# Patient Record
Sex: FEMALE | Race: WHITE | Employment: OTHER | URBAN - METROPOLITAN AREA
[De-identification: names, ages, dates, MRNs, and addresses within clinical notes are randomized per-mention and may not be internally consistent; named-entity substitution may affect disease eponyms.]

---

## 2017-04-19 ENCOUNTER — OFFICE VISIT (OUTPATIENT)
Dept: OBGYN | Facility: CLINIC | Age: 82
End: 2017-04-19
Payer: MEDICARE

## 2017-04-19 VITALS
DIASTOLIC BLOOD PRESSURE: 62 MMHG | SYSTOLIC BLOOD PRESSURE: 120 MMHG | WEIGHT: 118 LBS | HEIGHT: 62 IN | BODY MASS INDEX: 21.71 KG/M2

## 2017-04-19 DIAGNOSIS — Z01.419 ENCOUNTER FOR GYNECOLOGICAL EXAMINATION WITHOUT ABNORMAL FINDING: Primary | ICD-10-CM

## 2017-04-19 DIAGNOSIS — Z11.51 SCREENING FOR HUMAN PAPILLOMAVIRUS: ICD-10-CM

## 2017-04-19 PROCEDURE — G0101 CA SCREEN;PELVIC/BREAST EXAM: HCPCS | Performed by: OBSTETRICS & GYNECOLOGY

## 2017-04-19 PROCEDURE — G0145 SCR C/V CYTO,THINLAYER,RESCR: HCPCS | Performed by: OBSTETRICS & GYNECOLOGY

## 2017-04-19 ASSESSMENT — ANXIETY QUESTIONNAIRES
2. NOT BEING ABLE TO STOP OR CONTROL WORRYING: NOT AT ALL
6. BECOMING EASILY ANNOYED OR IRRITABLE: NOT AT ALL
7. FEELING AFRAID AS IF SOMETHING AWFUL MIGHT HAPPEN: NOT AT ALL
3. WORRYING TOO MUCH ABOUT DIFFERENT THINGS: NOT AT ALL
GAD7 TOTAL SCORE: 0
5. BEING SO RESTLESS THAT IT IS HARD TO SIT STILL: NOT AT ALL
1. FEELING NERVOUS, ANXIOUS, OR ON EDGE: NOT AT ALL
IF YOU CHECKED OFF ANY PROBLEMS ON THIS QUESTIONNAIRE, HOW DIFFICULT HAVE THESE PROBLEMS MADE IT FOR YOU TO DO YOUR WORK, TAKE CARE OF THINGS AT HOME, OR GET ALONG WITH OTHER PEOPLE: NOT DIFFICULT AT ALL

## 2017-04-19 ASSESSMENT — PATIENT HEALTH QUESTIONNAIRE - PHQ9: 5. POOR APPETITE OR OVEREATING: NOT AT ALL

## 2017-04-19 NOTE — PROGRESS NOTES
Tanna is a 83 year old  female who presents for Medicare Limited exam.     Do you have a Health Care Directive?: Yes: Patient states has Advance Directive and will bring in a copy to clinic.    Fall risk:   Fall Risk Assessment completed per order.    HPI : The patient is seen at this time for annual exam. She has a recent traumatic fracture of her left wrist and concussion from a traumatic fall.      GYNECOLOGIC HISTORY:  No LMP recorded. Patient is postmenopausal..   reports that she quit smoking about 57 years ago. Her smoking use included Cigarettes. She does not have any smokeless tobacco history on file.    STD testing offered?  Declined  Last PHQ-9 score on record=   PHQ-9 SCORE 2017   Total Score 0     Last GAD7 score on record=   DEQUAN-7 SCORE 2017   Total Score 0       HEALTH MAINTENANCE:  Cholesterol: (  Cholesterol   Date Value Ref Range Status   10/19/2015 229 (A) 115 - 199 mg/dL Final      Last Mammo: 10/2016, Result: normal, Next Mammo: 2017   Pap: (  Lab Results   Component Value Date    PAP NIL 2016    )  DEXA:  1-2 years ago  Colonoscopy:  , Result:  normal, Next Colonoscopy: No further follow up needed per pt.    HISTORY:  Obstetric History       T3      TAB0   SAB0   E0   M0   L3       # Outcome Date GA Lbr Robinson/2nd Weight Sex Delivery Anes PTL Lv   3 Term            2 Term            1 Term                 Past Medical History:   Diagnosis Date     Mild aortic insufficiency      Mild hypercholesterolemia      Squamous cell carcinoma of leg      Past Surgical History:   Procedure Laterality Date     LAPAROSCOPIC SALPINGO-OOPHORECTOMY Bilateral 2015    Procedure: LAPAROSCOPIC SALPINGO-OOPHORECTOMY;  Surgeon: Bob Tabares MD;  Location: [facility]     ORTHOPEDIC SURGERY      left hip replacement     Family History   Problem Relation Age of Onset     Family history unknown: Yes     Social History     Social History     Marital status:       "Spouse name: N/A     Number of children: N/A     Years of education: N/A     Social History Main Topics     Smoking status: Former Smoker     Types: Cigarettes     Quit date: 4/15/1960     Smokeless tobacco: None     Alcohol use 2.5 oz/week     5 Standard drinks or equivalent per week     Drug use: No     Sexual activity: Yes     Partners: Male     Other Topics Concern     None     Social History Narrative     Current Outpatient Prescriptions   Medication Sig     CLIMARA PRO 0.045-0.015 MG/DAY PTWK Place 1 patch onto the skin every 7 days     EPIPEN 2-SARAH 0.3 MG/0.3ML injection 2-pack as needed     COENZYME Q-10 PO Take 200 mg by mouth     NONFORMULARY Calcium 330 mg     tretinoin (RETIN-A) 0.05 % cream      simvastatin (ZOCOR) 20 MG tablet      biotin 2.5 mg/mL Take by mouth daily     VITAMIN D, CHOLECALCIFEROL, PO Take 1,000 Units by mouth daily     No current facility-administered medications for this visit.      Allergies   Allergen Reactions     Bees      Ciprofloxacin Hives     Iodine Other (See Comments)     Throat closing     Penicillins Hives     Remote hx of hives after 10 day course     Epinephrine Palpitations     Sulfa Drugs Rash       Past medical, surgical, social and family history were reviewed and updated in EPIC.    EXAM:  /62  Ht 5' 2.25\" (1.581 m)  Wt 118 lb (53.5 kg)  Breastfeeding? No  BMI 21.41 kg/m2   BMI: Body mass index is 21.41 kg/(m^2).    Constitutional: Appearance: Well nourished, well developed alert, in no acute distress  Breasts: Inspection of Breasts:  No lymphadenopathy present    Palpation of Breasts and Axillae:  No masses present on palpation, no  breast tenderness    Axillary Lymph Nodes:  No lymphadenopathy present  Neurologic/Psychiatric:    Mental Status:  Oriented X3     Pelvic Exam:  External Genitalia:     Normal appearance for age, no discharge present, no tenderness present, no inflammatory lesions present, color normal  Vagina:     Normal vaginal vault " without central or paravaginal defects, ATROPHIC  Bladder:     Nontender to palpation  Urethra:   Urethral Body:  Urethra palpation normal, urethra structural support normal   Urethral Meatus:  No erythema or lesions present  Cervix:     Appearance healthy, no lesions present, nontender to palpation, no bleeding present  Uterus:     Nontender to palpation, no masses present, position anteflexed, mobility: normal  Adnexa:     No adnexal tenderness present, no adnexal masses present  Perineum:     Perineum within normal limits, no evidence of trauma, no rashes or skin lesions present  Inguinal Lymph Nodes:     No lymphadenopathy present      Body mass index is 21.41 kg/(m^2).  Weight management plan noted, stable and monitoring   reports that she quit smoking about 57 years ago. Her smoking use included Cigarettes. She does not have any smokeless tobacco history on file.      ASSESSMENT:  83 year old female with satisfactory annual exam.      COUNSELING:   Reviewed preventive health counseling, as reflected in patient instructions       Regular exercise       Healthy diet/nutrition    PLAN/PATIENT INSTRUCTIONS:    Routine care    Bob Tabares MD

## 2017-04-19 NOTE — LETTER
May 4, 2017      Tanna TIGIST Anselmo  [address] [address]    Dear ,      I am happy to inform you that your cervical cancer screening test (PAP smear) was normal and your Human Papillomavirus (HPV) test was negative.    Per current guidelines, you no longer need to have pap smears completed. A routine pelvic exam should be performed annually.      Please continue to be seen every year for an annual wellness visit and other preventative tests.     Please contact my office at [phone] if you have further questions.    Sincerely,      Bob Tabares MD/BRITNEY RN

## 2017-04-19 NOTE — MR AVS SNAPSHOT
"              After Visit Summary   2017    Tanna Briones    MRN: [id number]           Patient Information     Date Of Birth          3/8/1934        Visit Information        Provider Department      2017 1:30 PM Bob Tabares MD [facility]        Today's Diagnoses     Encounter for gynecological examination without abnormal finding    -  1       Follow-ups after your visit        Who to contact     If you have questions or need follow up information about today's clinic visit or your schedule please contact [facility] directly at [phone].  Normal or non-critical lab and imaging results will be communicated to you by Sysomoshart, letter or phone within 4 business days after the clinic has received the results. If you do not hear from us within 7 days, please contact the clinic through Sysomoshart or phone. If you have a critical or abnormal lab result, we will notify you by phone as soon as possible.  Submit refill requests through ThromboVision or call your pharmacy and they will forward the refill request to us. Please allow 3 business days for your refill to be completed.          Additional Information About Your Visit        MyChart Information     ThromboVision lets you send messages to your doctor, view your test results, renew your prescriptions, schedule appointments and more. To sign up, go to [email or web address] . Click on \"Log in\" on the left side of the screen, which will take you to the Welcome page. Then click on \"Sign up Now\" on the right side of the page.     You will be asked to enter the access code listed below, as well as some personal information. Please follow the directions to create your username and password.     Your access code is: [email or web address]  Expires: 2017  2:02 PM     Your access code will  in 90 days. If you need help or a new code, please call your [facility] or [phone].        Care EveryWhere ID     This is your " "Care EveryWhere ID. This could be used by other organizations to access your [facility] medical records  [id number]        Your Vitals Were     Height Breastfeeding? BMI (Body Mass Index)             5' 2.25\" (1.581 m) No 21.41 kg/m2          Blood Pressure from Last 3 Encounters:   04/19/17 120/62   11/03/16 114/64   09/23/16 120/74    Weight from Last 3 Encounters:   04/19/17 118 lb (53.5 kg)   11/03/16 119 lb (54 kg)   09/23/16 118 lb (53.5 kg)              We Performed the Following     Medicare Limited Visit        Primary Care Provider Office Phone # Fax #    Christiano Clark [phone]       [employer]  [phone]  [facility] [address]        Thank you!     Thank you for choosing [facility] [place]  for your care. Our goal is always to provide you with excellent care. Hearing back from our patients is one way we can continue to improve our services. Please take a few minutes to complete the written survey that you may receive in the mail after your visit with us. Thank you!             Your Updated Medication List - Protect others around you: Learn how to safely use, store and throw away your medicines at www.disposemymeds.org.          This list is accurate as of: 4/19/17  2:19 PM.  Always use your most recent med list.                   Brand Name Dispense Instructions for use    biotin 2.5 mg/mL Susp      Take by mouth daily       CLIMARA PRO 0.045-0.015 MG/DAY Ptwk   Generic drug:  Estradiol-Levonorgestrel     12 patch    Place 1 patch onto the skin every 7 days       COENZYME Q-10 PO      Take 200 mg by mouth       EPIPEN 2-SARAH 0.3 MG/0.3ML injection   Generic drug:  EPINEPHrine      as needed       NONFORMULARY      Calcium 330 mg       simvastatin 20 MG tablet    ZOCOR         tretinoin 0.05 % cream    RETIN-A         VITAMIN D (CHOLECALCIFEROL) PO      Take 1,000 Units by mouth daily         "

## 2017-04-20 ASSESSMENT — ANXIETY QUESTIONNAIRES: GAD7 TOTAL SCORE: 0

## 2017-04-20 ASSESSMENT — PATIENT HEALTH QUESTIONNAIRE - PHQ9: SUM OF ALL RESPONSES TO PHQ QUESTIONS 1-9: 0

## 2017-04-24 LAB
COPATH REPORT: NORMAL
PAP: NORMAL

## 2017-04-25 PROCEDURE — 87624 HPV HI-RISK TYP POOLED RSLT: CPT | Performed by: OBSTETRICS & GYNECOLOGY

## 2017-04-27 LAB
FINAL DIAGNOSIS: NORMAL
HPV HR 12 DNA CVX QL NAA+PROBE: NEGATIVE
HPV16 DNA SPEC QL NAA+PROBE: NEGATIVE
HPV18 DNA SPEC QL NAA+PROBE: NEGATIVE
SPECIMEN DESCRIPTION: NORMAL

## 2017-07-18 ENCOUNTER — OFFICE VISIT (OUTPATIENT)
Dept: OBGYN | Facility: CLINIC | Age: 82
End: 2017-07-18
Payer: MEDICARE

## 2017-07-18 VITALS — BODY MASS INDEX: 21.59 KG/M2 | WEIGHT: 119 LBS | DIASTOLIC BLOOD PRESSURE: 74 MMHG | SYSTOLIC BLOOD PRESSURE: 160 MMHG

## 2017-07-18 DIAGNOSIS — R30.0 DYSURIA: Primary | ICD-10-CM

## 2017-07-18 LAB
ALBUMIN UR-MCNC: NEGATIVE MG/DL
APPEARANCE UR: CLEAR
BILIRUB UR QL STRIP: NEGATIVE
COLOR UR AUTO: YELLOW
GLUCOSE UR STRIP-MCNC: NEGATIVE MG/DL
HGB UR QL STRIP: ABNORMAL
KETONES UR STRIP-MCNC: NEGATIVE MG/DL
LEUKOCYTE ESTERASE UR QL STRIP: NEGATIVE
NITRATE UR QL: NEGATIVE
NON-SQ EPI CELLS #/AREA URNS LPF: ABNORMAL /LPF
PH UR STRIP: 6 PH (ref 5–7)
RBC #/AREA URNS AUTO: ABNORMAL /HPF (ref 0–2)
SP GR UR STRIP: 1.01 (ref 1–1.03)
URN SPEC COLLECT METH UR: ABNORMAL
UROBILINOGEN UR STRIP-ACNC: 0.2 EU/DL (ref 0.2–1)
WBC #/AREA URNS AUTO: ABNORMAL /HPF (ref 0–2)

## 2017-07-18 PROCEDURE — 81001 URINALYSIS AUTO W/SCOPE: CPT | Performed by: OBSTETRICS & GYNECOLOGY

## 2017-07-18 PROCEDURE — 99212 OFFICE O/P EST SF 10 MIN: CPT | Performed by: OBSTETRICS & GYNECOLOGY

## 2017-07-18 RX ORDER — CLINDAMYCIN HCL 300 MG
CAPSULE ORAL
Refills: 0 | COMMUNITY
Start: 2017-06-21

## 2017-07-18 RX ORDER — LEVOTHYROXINE SODIUM 25 UG/1
TABLET ORAL
Refills: 1 | COMMUNITY
Start: 2017-05-03

## 2017-07-18 NOTE — MR AVS SNAPSHOT
"              After Visit Summary   2017    Tanna Briones    MRN: [id number]           Patient Information     Date Of Birth          3/8/1934        Visit Information        Provider Department      2017 8:40 AM Davy Turcios MD [facility]        Today's Diagnoses     Dysuria    -  1       Follow-ups after your visit        Who to contact     If you have questions or need follow up information about today's clinic visit or your schedule please contact [facility] directly at [phone].  Normal or non-critical lab and imaging results will be communicated to you by FlexElhart, letter or phone within 4 business days after the clinic has received the results. If you do not hear from us within 7 days, please contact the clinic through FlexElhart or phone. If you have a critical or abnormal lab result, we will notify you by phone as soon as possible.  Submit refill requests through Graph Alchemist or call your pharmacy and they will forward the refill request to us. Please allow 3 business days for your refill to be completed.          Additional Information About Your Visit        MyChart Information     Graph Alchemist lets you send messages to your doctor, view your test results, renew your prescriptions, schedule appointments and more. To sign up, go to [email or web address] . Click on \"Log in\" on the left side of the screen, which will take you to the Welcome page. Then click on \"Sign up Now\" on the right side of the page.     You will be asked to enter the access code listed below, as well as some personal information. Please follow the directions to create your username and password.     Your access code is: [email or web address]  Expires: 2017  2:02 PM     Your access code will  in 90 days. If you need help or a new code, please call your [facility] or [phone].        Care EveryWhere ID     This is your Care EveryWhere ID. This could be used by other " organizations to access your [facility] medical records  [id number]        Your Vitals Were     Breastfeeding? BMI (Body Mass Index)                No 21.59 kg/m2           Blood Pressure from Last 3 Encounters:   07/18/17 160/74   04/19/17 120/62   11/03/16 114/64    Weight from Last 3 Encounters:   07/18/17 119 lb (54 kg)   04/19/17 118 lb (53.5 kg)   11/03/16 119 lb (54 kg)              We Performed the Following     UA with Microscopic        Primary Care Provider Office Phone # Fax #    Christiano Veronika Clark [phone]       [employer]  [phone]  [facility] [address]        Equal Access to Services     KALA SARABIA : Kosta Benavides, benji powers, miguelina tapia, steve prakash. So [facility] [phone].    ATENCIÓN: Si habla español, tiene a maddox disposición servicios gratuitos de asistencia lingüística. Llame al [phone].    We comply with applicable federal civil rights laws and [place] laws. We do not discriminate on the basis of race, color, national origin, age, disability sex, sexual orientation or gender identity.            Thank you!     Thank you for choosing [facility] [place]  for your care. Our goal is always to provide you with excellent care. Hearing back from our patients is one way we can continue to improve our services. Please take a few minutes to complete the written survey that you may receive in the mail after your visit with us. Thank you!             Your Updated Medication List - Protect others around you: Learn how to safely use, store and throw away your medicines at www.disposemymeds.org.          This list is accurate as of: 7/18/17 10:45 AM.  Always use your most recent med list.                   Brand Name Dispense Instructions for use Diagnosis    biotin 2.5 mg/mL Susp      Take by mouth daily        clindamycin 300 MG capsule    CLEOCIN     TK 1 C PO TID FOR 24 HOURS FOR DENTAL WORK         EPIPEN 2-SARAH 0.3 MG/0.3ML injection 2-pack   Generic drug:  EPINEPHrine      as needed        levothyroxine 25 MCG tablet    SYNTHROID/LEVOTHROID          NONFORMULARY      Calcium 330 mg        PROBIOTIC ACIDOPHILUS PO           simvastatin 20 MG tablet    ZOCOR          tretinoin 0.05 % cream    RETIN-A          VITAMIN D (CHOLECALCIFEROL) PO      Take 1,000 Units by mouth daily

## 2017-07-18 NOTE — PROGRESS NOTES
SUBJECTIVE:                                                   Tanna Briones is a 83 year old female who presents to clinic today for the following health issue(s):  Patient presents with:  Urinary Problem: started yesterday, feeling a pressure. also has urgency      HPI:  Having frequency and bladder pressure. Symptoms started yesterday. No dysuria. No hematuria. Feels symptoms are much better today.  No hx of frequent bladder infections.   No HRT.    No LMP recorded. Patient is postmenopausal..   Patient is sexually active, .  Using menopause for contraception.    reports that she quit smoking about 57 years ago. Her smoking use included Cigarettes. She has never used smokeless tobacco.    STD testing offered?  Declined    Health maintenance updated:  yes    Today's PHQ-2 Score:   PHQ-2 (  [employer]) 3/30/2016   Q1: Little interest or pleasure in doing things 0   Q2: Feeling down, depressed or hopeless 0   PHQ-2 Score 0     Today's PHQ-9 Score:   PHQ-9 SCORE 2017   Total Score 0     Today's DEQUAN-7 Score:   DEQUAN-7 SCORE 2017   Total Score 0       Problem list and histories reviewed & adjusted, as indicated.  Additional history: as documented.    Patient Active Problem List   Diagnosis     Cervical cancer screening     Past Surgical History:   Procedure Laterality Date     LAPAROSCOPIC SALPINGO-OOPHORECTOMY Bilateral 2015    Procedure: LAPAROSCOPIC SALPINGO-OOPHORECTOMY;  Surgeon: Bob Tabares MD;  Location: [facility]     ORTHOPEDIC SURGERY      left hip replacement      Social History   Substance Use Topics     Smoking status: Former Smoker     Types: Cigarettes     Quit date: 4/15/1960     Smokeless tobacco: Never Used     Alcohol use 2.5 oz/week     5 Standard drinks or equivalent per week      *Family history is unknown by patient.            Current Outpatient Prescriptions   Medication Sig     clindamycin (CLEOCIN) 300 MG capsule TK 1 C PO TID FOR 24 HOURS FOR DENTAL WORK      levothyroxine (SYNTHROID/LEVOTHROID) 25 MCG tablet      Lactobacillus (PROBIOTIC ACIDOPHILUS PO)      EPIPEN 2-SARAH 0.3 MG/0.3ML injection 2-pack as needed     NONFORMULARY Calcium 330 mg     tretinoin (RETIN-A) 0.05 % cream      simvastatin (ZOCOR) 20 MG tablet      biotin 2.5 mg/mL Take by mouth daily     VITAMIN D, CHOLECALCIFEROL, PO Take 1,000 Units by mouth daily     No current facility-administered medications for this visit.      Allergies   Allergen Reactions     Bees      Ciprofloxacin Hives     Iodine Other (See Comments)     Throat closing     Penicillins Hives     Remote hx of hives after 10 day course     Epinephrine Palpitations     Sulfa Drugs Rash       ROS:  12 point review of systems negative other than symptoms noted below.  Genitourinary: Urgency    OBJECTIVE:     /74  Wt 119 lb (54 kg)  Breastfeeding? No  BMI 21.59 kg/m2  Body mass index is 21.59 kg/(m^2).    Exam:  Constitutional:  Appearance: Well nourished, well developed alert, in no acute distress  Neurologic/Psychiatric:  Mental Status:  Oriented X3      In-Clinic Test Results:  Results for orders placed or performed in visit on 07/18/17 (from the past 24 hour(s))   UA with Microscopic   Result Value Ref Range    Color Urine Yellow     Appearance Urine Clear     Glucose Urine Negative NEG mg/dL    Bilirubin Urine Negative NEG    Ketones Urine Negative NEG mg/dL    Specific Gravity Urine 1.010 1.003 - 1.035    pH Urine 6.0 5.0 - 7.0 pH    Protein Albumin Urine Negative NEG mg/dL    Urobilinogen Urine 0.2 0.2 - 1.0 EU/dL    Nitrite Urine Negative NEG    Blood Urine Trace (A) NEG    Leukocyte Esterase Urine Negative NEG    Source Midstream Urine     WBC Urine O - 2 0 - 2 /HPF    RBC Urine O - 2 0 - 2 /HPF    Squamous Epithelial /LPF Urine Few FEW /LPF       ASSESSMENT/PLAN:                                                        ICD-10-CM    1. Dysuria R30.0 UA with Microscopic     UA with Microscopic       UA negative and symptoms  not diagnostic. Symptoms also resolved. Will observe for now.     Davy Turcios MD  [facility]

## 2017-08-22 ENCOUNTER — OFFICE VISIT (OUTPATIENT)
Dept: OBGYN | Facility: CLINIC | Age: 82
End: 2017-08-22
Payer: MEDICARE

## 2017-08-22 VITALS — WEIGHT: 120 LBS | DIASTOLIC BLOOD PRESSURE: 78 MMHG | SYSTOLIC BLOOD PRESSURE: 176 MMHG | BODY MASS INDEX: 21.77 KG/M2

## 2017-08-22 DIAGNOSIS — N64.4 BREAST PAIN: Primary | ICD-10-CM

## 2017-08-22 PROCEDURE — 99213 OFFICE O/P EST LOW 20 MIN: CPT | Performed by: OBSTETRICS & GYNECOLOGY

## 2017-08-22 RX ORDER — PREDNISOLONE ACETATE 10 MG/ML
SUSPENSION/ DROPS OPHTHALMIC
COMMUNITY
Start: 2017-08-16 | End: 2018-08-20

## 2017-08-22 RX ORDER — KETOROLAC TROMETHAMINE 5 MG/ML
SOLUTION OPHTHALMIC
COMMUNITY
Start: 2017-08-16 | End: 2018-08-20

## 2017-08-22 RX ORDER — TOBRAMYCIN 3 MG/ML
SOLUTION/ DROPS OPHTHALMIC
COMMUNITY
Start: 2017-08-16 | End: 2018-08-20

## 2017-08-22 RX ORDER — METOPROLOL SUCCINATE 25 MG/1
25 TABLET, EXTENDED RELEASE ORAL
COMMUNITY
Start: 2017-08-21 | End: 2017-12-19

## 2017-08-22 NOTE — PROGRESS NOTES
SUBJECTIVE:                                                   Tanna Briones is a 83 year old female who presents to clinic today for the following health issue(s):  Patient presents with:  Breast Problem        HPI: The patient is seen for 2 problems at this time. The first is a small white area that she pinched on her left nipple and some cheesy material came out. She has a known sebaceous cyst that or clogged duct on that nipple. This was distinctly different than that. She had not noticed any blood. The second issue is her blood pressure is been elevated recently and she was just started on metoprolol. She did not take a dose this morning and her blood pressure was 126/88 in the office today. She is shifting her dose to the PMs.      No LMP recorded. Patient is postmenopausal..   Patient is sexually active, .  Using menopause for contraception.    reports that she quit smoking about 57 years ago. Her smoking use included Cigarettes. She has never used smokeless tobacco.      STD testing offered?  Declined    Health maintenance updated:  yes    Today's PHQ-2 Score:   PHQ-2 (  [employer]) 3/30/2016   Q1: Little interest or pleasure in doing things 0   Q2: Feeling down, depressed or hopeless 0   PHQ-2 Score 0     Today's PHQ-9 Score:   PHQ-9 SCORE 2017   Total Score 0     Today's DEQUAN-7 Score:   DEQUAN-7 SCORE 2017   Total Score 0       Problem list and histories reviewed & adjusted, as indicated.  Additional history: as documented.    Patient Active Problem List   Diagnosis     Cervical cancer screening     Past Surgical History:   Procedure Laterality Date     LAPAROSCOPIC SALPINGO-OOPHORECTOMY Bilateral 2015    Procedure: LAPAROSCOPIC SALPINGO-OOPHORECTOMY;  Surgeon: Bob Tabares MD;  Location: [facility]     ORTHOPEDIC SURGERY      left hip replacement      Social History   Substance Use Topics     Smoking status: Former Smoker     Types: Cigarettes     Quit date: 4/15/1960      Smokeless tobacco: Never Used     Alcohol use 2.5 oz/week     5 Standard drinks or equivalent per week      *Family history is unknown by patient.            Current Outpatient Prescriptions   Medication Sig     ketorolac (ACULAR) 0.5 % ophthalmic solution 1 drop in surgical eye 2x/day for 1 wk, then 1 drop 1x/day for 7 wks. Start 3 days before surgery.     metoprolol (TOPROL-XL) 25 MG 24 hr tablet Take 25 mg by mouth     prednisoLONE acetate (PRED FORTE) 1 % ophthalmic susp 1 drop in surgical eye 4x/day for 1 wk, then 1 drop 2x/day for 7 wks. Start 3 days before surgery. SHAKE WELL     tobramycin (TOBREX) 0.3 % ophthalmic solution 1 drop in surgical eye 4x/day for 1 wk, then 2x/day for 7 wks. Start 3 days before surgery     clindamycin (CLEOCIN) 300 MG capsule TK 1 C PO TID FOR 24 HOURS FOR DENTAL WORK     levothyroxine (SYNTHROID/LEVOTHROID) 25 MCG tablet      Lactobacillus (PROBIOTIC ACIDOPHILUS PO)      EPIPEN 2-SARAH 0.3 MG/0.3ML injection 2-pack as needed     NONFORMULARY Calcium 330 mg     tretinoin (RETIN-A) 0.05 % cream      simvastatin (ZOCOR) 20 MG tablet      biotin 2.5 mg/mL Take by mouth daily     VITAMIN D, CHOLECALCIFEROL, PO Take 1,000 Units by mouth daily     No current facility-administered medications for this visit.      Allergies   Allergen Reactions     Bees      Ciprofloxacin Hives     Iodine Other (See Comments)     Throat closing     Penicillins Hives     Remote hx of hives after 10 day course     Epinephrine Palpitations     Sulfa Drugs Rash     Sulfasalazine Rash       ROS:  12 point review of systems negative other than symptoms noted below.    OBJECTIVE:     /78  Wt 120 lb (54.4 kg)  Breastfeeding? No  BMI 21.77 kg/m2  Body mass index is 21.77 kg/(m^2).    Exam:  Constitutional:  Appearance: Well nourished, well developed alert, in no acute distress  Breasts:  Inspection of Breasts:  No lymphadenopathy present; Palpation of Breasts and Axillae:  No masses present on palpation, no  breast tenderness Axillary Lymph Nodes:  No lymphadenopathy present     In-Clinic Test Results:      ASSESSMENT/PLAN:                                                      The patient had a small sebaceous cyst or clogged duct on the left nipple. We have asked her to not impinge or manipulate this. She will see her internist for blood pressure monitoring and potential change of medications if her diastolics are still high. She has a mild headache and is having her home at this time.            Bob Tabares MD  [facility] [place]

## 2017-08-22 NOTE — MR AVS SNAPSHOT
"              After Visit Summary   2017    Tanna Briones    MRN: [id number]           Patient Information     Date Of Birth          3/8/1934        Visit Information        Provider Department      2017 4:30 PM Bob Tabares MD [facility]        Today's Diagnoses     Breast pain    -  1       Follow-ups after your visit        Who to contact     If you have questions or need follow up information about today's clinic visit or your schedule please contact [facility] directly at [phone].  Normal or non-critical lab and imaging results will be communicated to you by Miaoyushanghart, letter or phone within 4 business days after the clinic has received the results. If you do not hear from us within 7 days, please contact the clinic through Miaoyushanghart or phone. If you have a critical or abnormal lab result, we will notify you by phone as soon as possible.  Submit refill requests through OpenSpirit or call your pharmacy and they will forward the refill request to us. Please allow 3 business days for your refill to be completed.          Additional Information About Your Visit        MyChart Information     OpenSpirit lets you send messages to your doctor, view your test results, renew your prescriptions, schedule appointments and more. To sign up, go to [email or web address] . Click on \"Log in\" on the left side of the screen, which will take you to the Welcome page. Then click on \"Sign up Now\" on the right side of the page.     You will be asked to enter the access code listed below, as well as some personal information. Please follow the directions to create your username and password.     Your access code is: [email or web address]  Expires: 2017  4:48 PM     Your access code will  in 90 days. If you need help or a new code, please call your [place] clinic or [phone].        Care EveryWhere ID     This is your Care EveryWhere ID. This could be used by other " organizations to access your [facility] medical records  [id number]        Your Vitals Were     Breastfeeding? BMI (Body Mass Index)                No 21.77 kg/m2           Blood Pressure from Last 3 Encounters:   08/22/17 176/78   07/18/17 160/74   04/19/17 120/62    Weight from Last 3 Encounters:   08/22/17 54.4 kg (120 lb)   07/18/17 54 kg (119 lb)   04/19/17 53.5 kg (118 lb)              Today, you had the following     No orders found for display       Primary Care Provider Office Phone # Fax #    Christiano Veronika Clark [phone]       [employer] [address]  [facility] [address]        Equal Access to Services     KALA SARABIA : Kosta coelloo Makayla, waaxda luqadaha, qaybta kaalmada adehay, steve prakash. So [facility] [phone].    ATENCIÓN: Si habla español, tiene a maddox disposición servicios gratuitos de asistencia lingüística. [facility] [phone].    We comply with applicable federal civil rights laws and [place] laws. We do not discriminate on the basis of race, color, national origin, age, disability sex, sexual orientation or gender identity.            Thank you!     Thank you for choosing [facility]  for your care. Our goal is always to provide you with excellent care. Hearing back from our patients is one way we can continue to improve our services. Please take a few minutes to complete the written survey that you may receive in the mail after your visit with us. Thank you!             Your Updated Medication List - Protect others around you: Learn how to safely use, store and throw away your medicines at www.disposemymeds.org.          This list is accurate as of: 8/22/17  4:48 PM.  Always use your most recent med list.                   Brand Name Dispense Instructions for use Diagnosis    biotin 2.5 mg/mL Susp      Take by mouth daily        clindamycin 300 MG capsule    CLEOCIN     TK 1 C PO TID FOR 24 HOURS FOR DENTAL  WORK        EPIPEN 2-SARAH 0.3 MG/0.3ML injection 2-pack   Generic drug:  EPINEPHrine      as needed        ketorolac 0.5 % ophthalmic solution    ACULAR     1 drop in surgical eye 2x/day for 1 wk, then 1 drop 1x/day for 7 wks. Start 3 days before surgery.        levothyroxine 25 MCG tablet    SYNTHROID/LEVOTHROID          metoprolol 25 MG 24 hr tablet    TOPROL-XL     Take 25 mg by mouth        NONFORMULARY      Calcium 330 mg        prednisoLONE acetate 1 % ophthalmic susp    PRED FORTE     1 drop in surgical eye 4x/day for 1 wk, then 1 drop 2x/day for 7 wks. Start 3 days before surgery. SHAKE WELL        PROBIOTIC ACIDOPHILUS PO           simvastatin 20 MG tablet    ZOCOR          tobramycin 0.3 % ophthalmic solution    TOBREX     1 drop in surgical eye 4x/day for 1 wk, then 2x/day for 7 wks. Start 3 days before surgery        tretinoin 0.05 % cream    RETIN-A          VITAMIN D (CHOLECALCIFEROL) PO      Take 1,000 Units by mouth daily

## 2017-12-19 ENCOUNTER — TELEPHONE (OUTPATIENT)
Dept: OBGYN | Facility: CLINIC | Age: 82
End: 2017-12-19

## 2017-12-19 ENCOUNTER — OFFICE VISIT (OUTPATIENT)
Dept: OBGYN | Facility: CLINIC | Age: 82
End: 2017-12-19
Payer: MEDICARE

## 2017-12-19 VITALS — SYSTOLIC BLOOD PRESSURE: 142 MMHG | DIASTOLIC BLOOD PRESSURE: 64 MMHG | WEIGHT: 115 LBS | BODY MASS INDEX: 20.87 KG/M2

## 2017-12-19 DIAGNOSIS — L29.2 VULVAR ITCHING: Primary | ICD-10-CM

## 2017-12-19 LAB
SPECIMEN SOURCE: ABNORMAL
WET PREP SPEC: ABNORMAL

## 2017-12-19 PROCEDURE — 99213 OFFICE O/P EST LOW 20 MIN: CPT | Performed by: NURSE PRACTITIONER

## 2017-12-19 PROCEDURE — 87210 SMEAR WET MOUNT SALINE/INK: CPT | Performed by: NURSE PRACTITIONER

## 2017-12-19 RX ORDER — IBUPROFEN 200 MG
400 TABLET ORAL
COMMUNITY
Start: 2017-11-15

## 2017-12-19 RX ORDER — METOPROLOL SUCCINATE 50 MG/1
TABLET, EXTENDED RELEASE ORAL
Refills: 0 | COMMUNITY
Start: 2017-10-03 | End: 2018-02-07 | Stop reason: DRUGHIGH

## 2017-12-19 RX ORDER — NYSTATIN AND TRIAMCINOLONE ACETONIDE 100000; 1 [USP'U]/G; MG/G
CREAM TOPICAL 2 TIMES DAILY
Qty: 15 G | Refills: 1 | Status: SHIPPED | OUTPATIENT
Start: 2017-12-19 | End: 2019-04-24

## 2017-12-19 RX ORDER — ERGOCALCIFEROL 1.25 MG/1
CAPSULE, LIQUID FILLED ORAL
COMMUNITY
End: 2017-12-19

## 2017-12-19 RX ORDER — AZITHROMYCIN 500 MG/1
TABLET, FILM COATED ORAL
COMMUNITY
Start: 2017-09-19 | End: 2018-08-20

## 2017-12-19 RX ORDER — ALPRAZOLAM 0.25 MG
0.25 TABLET ORAL
COMMUNITY
Start: 2017-08-23

## 2017-12-19 NOTE — TELEPHONE ENCOUNTER
Reason for Call:  Other call back and prescription    Detailed comments: CALL ANUP AT [facility]    Phone Number Patient can be reached at: Other phone number:  [phone]    Best Time: TODAY    Can we leave a detailed message on this number? Not Applicable    Call taken on 12/19/2017 at 4:00 PM by Sabrina Matos

## 2017-12-19 NOTE — MR AVS SNAPSHOT
"              After Visit Summary   2017    Tanna Briones    MRN: [id number]           Patient Information     Date Of Birth          3/8/1934        Visit Information        Provider Department      2017 10:30 AM Justina Moreno APRN CNP [facility]        Today's Diagnoses     Vulvar itching    -  1       Follow-ups after your visit        Who to contact     If you have questions or need follow up information about today's clinic visit or your schedule please contact [facility] directly at [phone].  Normal or non-critical lab and imaging results will be communicated to you by Evozhart, letter or phone within 4 business days after the clinic has received the results. If you do not hear from us within 7 days, please contact the clinic through Evozhart or phone. If you have a critical or abnormal lab result, we will notify you by phone as soon as possible.  Submit refill requests through Elysia or call your pharmacy and they will forward the refill request to us. Please allow 3 business days for your refill to be completed.          Additional Information About Your Visit        MyChart Information     Elysia lets you send messages to your doctor, view your test results, renew your prescriptions, schedule appointments and more. To sign up, go to [email or web address] . Click on \"Log in\" on the left side of the screen, which will take you to the Welcome page. Then click on \"Sign up Now\" on the right side of the page.     You will be asked to enter the access code listed below, as well as some personal information. Please follow the directions to create your username and password.     Your access code is: [email or web address]  Expires: 3/19/2018 11:03 AM     Your access code will  in 90 days. If you need help or a new code, please call your [facility] or [phone].        Care EveryWhere ID     This is your Care EveryWhere ID. This could be used " by other organizations to access your [place] medical records  [id number]        Your Vitals Were     Breastfeeding? BMI (Body Mass Index)                No 20.87 kg/m2           Blood Pressure from Last 3 Encounters:   12/19/17 142/64   08/22/17 176/78   07/18/17 160/74    Weight from Last 3 Encounters:   12/19/17 115 lb (52.2 kg)   08/22/17 120 lb (54.4 kg)   07/18/17 119 lb (54 kg)              We Performed the Following     Wet prep          Today's Medication Changes          These changes are accurate as of: 12/19/17 11:03 AM.  If you have any questions, ask your nurse or doctor.               Start taking these medicines.        Dose/Directions    nystatin-triamcinolone cream   Commonly known as:  MYCOLOG II   Used for:  Vulvar itching   Started by:  Justina Moreno APRN CNP        Apply topically 2 times daily External use only.   Quantity:  15 g   Refills:  1            Where to get your medicines      These medications were sent to [email or web address] [address] [place] [address] RD S AT [facility] [place]  [address] [facility] [address], [facility] [address]     Phone:  [phone]     nystatin-triamcinolone cream                Primary Care Provider Office Phone # Fax #    Christiano Veronika Clark [phone]       [facility] [address]  [facility] [address]        Equal Access to Services     EMANI SARABIA AH: Kosta Benavides, benji powers, qaybdaina mccallumalsteve nicholson [facility] prakash. So [place] [phone].    ATENCIÓN: Si habla español, tiene a maddox disposición servicios gratuitos de asistencia lingüística. Llame al [phone].    We comply with applicable federal civil rights laws and [place] laws. We do not discriminate on the basis of race, color, national origin, age, disability, sex, sexual orientation, or gender identity.            Thank you!     Thank you for choosing [facility]  for  your care. Our goal is always to provide you with excellent care. Hearing back from our patients is one way we can continue to improve our services. Please take a few minutes to complete the written survey that you may receive in the mail after your visit with us. Thank you!             Your Updated Medication List - Protect others around you: Learn how to safely use, store and throw away your medicines at www.disposemymeds.org.          This list is accurate as of: 12/19/17 11:03 AM.  Always use your most recent med list.                   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 Start 3 days before surgery.        levothyroxine 25 MCG tablet    SYNTHROID/LEVOTHROID          metoprolol 50 MG 24 hr tablet    TOPROL-XL     Take 75mg daily        nystatin-triamcinolone cream    MYCOLOG II    15 g    Apply topically 2 times daily External use only.    Vulvar itching       prednisoLONE acetate 1 % ophthalmic susp    PRED FORTE     1 drop in surgical eye 4x/day for 1 wk, then 1 drop 2x/day for 7 wks. Start 3 days before surgery. SHAKE WELL        PROBIOTIC ACIDOPHILUS PO           simvastatin 20 MG tablet    ZOCOR          tobramycin 0.3 % ophthalmic solution    TOBREX     1 drop in surgical eye 4x/day for 1 wk, then 2x/day for 7 wks. Start 3 days before surgery        tretinoin 0.05 % cream    RETIN-A          VITAMIN D  (CHOLECALCIFEROL) PO      Take 1,000 Units by mouth daily

## 2017-12-19 NOTE — TELEPHONE ENCOUNTER
Braulio from pharmacy states pt's insurance will not cover combo cream, but will cover them as separate ingredients. Pharmacist wants to know if ok to separate. Routing to Justina Moreno to review and advise.

## 2017-12-19 NOTE — PROGRESS NOTES
SUBJECTIVE:                                                   Tanna Briones is a 83 year old female who presents to clinic today for the following health issue(s):  Patient presents with:  Vaginal Problem      HPI:  Pt here today with c/o vaginal itching. She noticed it a few days ago, she has not used anything OTC. She states is more external. No internal itching. No discharge or odor.    She bathes with hot water and typically bubble bath.    Her  has terrible dementia and is at [facility] right now waiting for placement.    No LMP recorded. Patient is postmenopausal..   Patient is not sexually active, .  Using menopause for contraception.    reports that she quit smoking about 57 years ago. Her smoking use included Cigarettes. She has never used smokeless tobacco.      STD testing offered?  Declined    Health maintenance updated:  yes    Today's PHQ-2 Score:   PHQ-2 (  [employer]) 3/30/2016   Q1: Little interest or pleasure in doing things 0   Q2: Feeling down, depressed or hopeless 0   PHQ-2 Score 0     Today's PHQ-9 Score:   PHQ-9 SCORE 2017   Total Score 0     Today's DEQUAN-7 Score:   DEQUAN-7 SCORE 2017   Total Score 0       Problem list and histories reviewed & adjusted, as indicated.  Additional history: as documented.    Patient Active Problem List   Diagnosis     Cervical cancer screening     Past Surgical History:   Procedure Laterality Date     LAPAROSCOPIC SALPINGO-OOPHORECTOMY Bilateral 2015    Procedure: LAPAROSCOPIC SALPINGO-OOPHORECTOMY;  Surgeon: Bob Tabares MD;  Location: [facility]     ORTHOPEDIC SURGERY      left hip replacement      Social History   Substance Use Topics     Smoking status: Former Smoker     Types: Cigarettes     Quit date: 4/15/1960     Smokeless tobacco: Never Used     Alcohol use 2.5 oz/week     5 Standard drinks or equivalent per week      *Family history is unknown by patient.            Current Outpatient Prescriptions   Medication Sig      ibuprofen (ADVIL/MOTRIN) 200 MG tablet Take 400 mg by mouth     azithromycin (ZITHROMAX) 500 MG tablet One PO one hour prior to procedure     ALPRAZolam (XANAX) 0.25 MG tablet Take 0.25 mg by mouth     metoprolol (TOPROL-XL) 50 MG 24 hr tablet Take 75mg daily     nystatin-triamcinolone (MYCOLOG II) cream Apply topically 2 times daily External use only.     ketorolac (ACULAR) 0.5 % ophthalmic solution 1 drop in surgical eye 2x/day for 1 wk, then 1 drop 1x/day for 7 wks. Start 3 days before surgery.     prednisoLONE acetate (PRED FORTE) 1 % ophthalmic susp 1 drop in surgical eye 4x/day for 1 wk, then 1 drop 2x/day for 7 wks. Start 3 days before surgery. SHAKE WELL     tobramycin (TOBREX) 0.3 % ophthalmic solution 1 drop in surgical eye 4x/day for 1 wk, then 2x/day for 7 wks. Start 3 days before surgery     clindamycin (CLEOCIN) 300 MG capsule TK 1 C PO TID FOR 24 HOURS FOR DENTAL WORK     levothyroxine (SYNTHROID/LEVOTHROID) 25 MCG tablet      Lactobacillus (PROBIOTIC ACIDOPHILUS PO)      EPIPEN 2-SARAH 0.3 MG/0.3ML injection 2-pack as needed     tretinoin (RETIN-A) 0.05 % cream      simvastatin (ZOCOR) 20 MG tablet      biotin 2.5 mg/mL Take by mouth daily     VITAMIN D, CHOLECALCIFEROL, PO Take 1,000 Units by mouth daily     [DISCONTINUED] metoprolol (TOPROL-XL) 25 MG 24 hr tablet Take 25 mg by mouth     No current facility-administered medications for this visit.      Allergies   Allergen Reactions     Bees      Ciprofloxacin Hives     Iodine Other (See Comments)     Throat closing     Penicillins Hives     Remote hx of hives after 10 day course     Epinephrine Palpitations     Sulfa Drugs Rash     Sulfasalazine Rash       ROS:  12 point review of systems negative other than symptoms noted below.  Genitourinary: Vaginal Itching  Psychiatric: Anxiety    OBJECTIVE:     /64  Wt 115 lb (52.2 kg)  Breastfeeding? No  BMI 20.87 kg/m2  Body mass index is 20.87 kg/(m^2).    Exam:  Constitutional:  Appearance: Well  nourished, well developed alert, in no acute distress  Neurologic/Psychiatric:  Mental Status:  Oriented X3   Pelvic Exam:  External Genitalia:     Normal appearance for age, no discharge present, no tenderness present, no inflammatory lesions present, color normal  Vagina:     Normal vaginal vault without central or paravaginal defects, ATROPHIC  Bladder:     Nontender to palpation  Urethra:   Urethral Body:  Urethra palpation normal, urethra structural support normal   Urethral Meatus:  No erythema or lesions present  Cervix:     Appearance healthy, no lesions present, nontender to palpation, no bleeding present  Uterus:     Nontender to palpation, no masses present, position anteflexed, mobility: normal  Adnexa:     No adnexal tenderness present, no adnexal masses present  Perineum:     Perineum within normal limits, no evidence of trauma, no rashes or skin lesions present  Inguinal Lymph Nodes:     No lymphadenopathy present       In-Clinic Test Results:  Results for orders placed or performed in visit on 12/19/17 (from the past 24 hour(s))   Wet prep   Result Value Ref Range    Specimen Description Vagina     Wet Prep No Trichomonas seen     Wet Prep Clue cells seen (A)     Wet Prep No yeast seen        ASSESSMENT/PLAN:                                                        ICD-10-CM    1. Vulvar itching L29.2 Wet prep     nystatin-triamcinolone (MYCOLOG II) cream       There are no Patient Instructions on file for this visit.    Wet prep + BV. Pt reluctant to go on abx. Will continue bath soaks WITH OUT bubble bath. Will rx cream for external vulvar irritation.    DIANA Dewitt [facility]

## 2018-02-07 ENCOUNTER — OFFICE VISIT (OUTPATIENT)
Dept: OBGYN | Facility: CLINIC | Age: 83
End: 2018-02-07
Payer: MEDICARE

## 2018-02-07 VITALS
WEIGHT: 118 LBS | DIASTOLIC BLOOD PRESSURE: 76 MMHG | HEART RATE: 72 BPM | BODY MASS INDEX: 21.71 KG/M2 | HEIGHT: 62 IN | SYSTOLIC BLOOD PRESSURE: 134 MMHG

## 2018-02-07 DIAGNOSIS — N89.8 VAGINAL IRRITATION: Primary | ICD-10-CM

## 2018-02-07 LAB
GRAM STN SPEC: NORMAL
GRAM STN SPEC: NORMAL
SPECIMEN SOURCE: NORMAL

## 2018-02-07 PROCEDURE — 87205 SMEAR GRAM STAIN: CPT | Performed by: OBSTETRICS & GYNECOLOGY

## 2018-02-07 PROCEDURE — 99213 OFFICE O/P EST LOW 20 MIN: CPT | Performed by: OBSTETRICS & GYNECOLOGY

## 2018-02-07 RX ORDER — METOPROLOL SUCCINATE 25 MG/1
75 TABLET, EXTENDED RELEASE ORAL
COMMUNITY
Start: 2018-01-04

## 2018-02-07 NOTE — PROGRESS NOTES
SUBJECTIVE:                                                   Tanna Briones is a 83 year old female who presents to clinic today for the following health issue(s):  Patient presents with:  RECHECK: Wants to be rechecked for BV; saw Justina in December, was dx with BV. Never took antibiotic and not having any more sx. Wants to know if gone      HPI: The patient is seen in follow-up of both vulvar and vaginal irritation.  She has no abnormal discharge at this time.  She is still treating symptomatically with Cystospaz on a fairly regular basis.  She has no specific lesions externally.      No LMP recorded. Patient is postmenopausal..   Patient is not sexually active, .  Using not sexually active for contraception.    reports that she quit smoking about 57 years ago. Her smoking use included Cigarettes. She has never used smokeless tobacco.    STD testing offered?  Declined    Health maintenance updated:  yes    Today's PHQ-2 Score:   PHQ-2 (  [employer]) 3/30/2016   Q1: Little interest or pleasure in doing things 0   Q2: Feeling down, depressed or hopeless 0   PHQ-2 Score 0     Today's PHQ-9 Score:   PHQ-9 SCORE 2017   Total Score 0     Today's DEQUAN-7 Score:   DEQUAN-7 SCORE 2017   Total Score 0       Problem list and histories reviewed & adjusted, as indicated.  Additional history: as documented.    Patient Active Problem List   Diagnosis     Cervical cancer screening     Past Surgical History:   Procedure Laterality Date     LAPAROSCOPIC SALPINGO-OOPHORECTOMY Bilateral 2015    Procedure: LAPAROSCOPIC SALPINGO-OOPHORECTOMY;  Surgeon: Bob Tabares MD;  [facility]     ORTHOPEDIC SURGERY      left hip replacement      Social History   Substance Use Topics     Smoking status: Former Smoker     Types: Cigarettes     Quit date: 4/15/1960     Smokeless tobacco: Never Used     Alcohol use 2.5 oz/week     5 Standard drinks or equivalent per week      *Family history is unknown by patient.     "        Current Outpatient Prescriptions   Medication Sig     metoprolol succinate (TOPROL-XL) 25 MG 24 hr tablet Take 25 mg by mouth     ibuprofen (ADVIL/MOTRIN) 200 MG tablet Take 400 mg by mouth     azithromycin (ZITHROMAX) 500 MG tablet One PO one hour prior to procedure     ALPRAZolam (XANAX) 0.25 MG tablet Take 0.25 mg by mouth     nystatin-triamcinolone (MYCOLOG II) cream Apply topically 2 times daily External use only.     ketorolac (ACULAR) 0.5 % ophthalmic solution 1 drop in surgical eye 2x/day for 1 wk, then 1 drop 1x/day for 7 wks. Start 3 days before surgery.     prednisoLONE acetate (PRED FORTE) 1 % ophthalmic susp 1 drop in surgical eye 4x/day for 1 wk, then 1 drop 2x/day for 7 wks. Start 3 days before surgery. SHAKE WELL     tobramycin (TOBREX) 0.3 % ophthalmic solution 1 drop in surgical eye 4x/day for 1 wk, then 2x/day for 7 wks. Start 3 days before surgery     clindamycin (CLEOCIN) 300 MG capsule TK 1 C PO TID FOR 24 HOURS FOR DENTAL WORK     levothyroxine (SYNTHROID/LEVOTHROID) 25 MCG tablet      Lactobacillus (PROBIOTIC ACIDOPHILUS PO)      tretinoin (RETIN-A) 0.05 % cream      simvastatin (ZOCOR) 20 MG tablet      biotin 2.5 mg/mL Take by mouth daily     VITAMIN D, CHOLECALCIFEROL, PO Take 1,000 Units by mouth daily     [DISCONTINUED] metoprolol (TOPROL-XL) 50 MG 24 hr tablet Take 75mg daily     EPIPEN 2-SARAH 0.3 MG/0.3ML injection 2-pack as needed     No current facility-administered medications for this visit.      Allergies   Allergen Reactions     Bees      Ciprofloxacin Hives     Iodine Other (See Comments)     Throat closing     Penicillins Hives     Remote hx of hives after 10 day course     Epinephrine Palpitations     Sulfa Drugs Rash     Sulfasalazine Rash       ROS:  12 point review of systems negative other than symptoms noted below.    OBJECTIVE:     /76  Pulse 72  Ht 5' 2.25\" (1.581 m)  Wt 118 lb (53.5 kg)  BMI 21.41 kg/m2  Body mass index is 21.41 " kg/(m^2).    Exam:  Constitutional:  Appearance: Well nourished, well developed alert, in no acute distress  Gastrointestinal:  Abdominal Examination:  Abdomen nontender to palpation, tone normal without rigidity or guarding, no masses present, umbilicus without lesions; Liver/Spleen:  No hepatomegaly present, liver nontender to palpation; Hernias:  No hernias present  Lymphatic: Lymph Nodes:  No other lymphadenopathy present  Skin:General Inspection:  No rashes present, no lesions present, no areas of discoloration; Genitalia and Groin:  No rashes present, no lesions present, no areas of discoloration, no masses present.  Neurologic/Psychiatric:  Mental Status:  Oriented X3   Pelvic Exam:  External Genitalia:     Normal appearance for age, no discharge present, no tenderness present, no inflammatory lesions present, color normal  Vagina:     Normal vaginal vault without central or paravaginal defects, ATROPHIC  Bladder:     Nontender to palpation  Urethra:   Urethral Body:  Urethra palpation normal, urethra structural support normal   Urethral Meatus:  No erythema or lesions present  Cervix:     Appearance healthy, no lesions present, nontender to palpation, no bleeding present  Uterus:     Nontender to palpation, no masses present, position anteflexed, mobility: normal  Adnexa:     No adnexal tenderness present, no adnexal masses present  Perineum:     Perineum within normal limits, no evidence of trauma, no rashes or skin lesions present  Inguinal Lymph Nodes:     No lymphadenopathy present       In-Clinic Test Results:      ASSESSMENT/PLAN:                                                        83-year-old patient who is stable at both the vaginal and vulvar levels.  Culture is pending at this time.  She will continue with her sitz bath regimen and report any specific lesions to us.      Bob Tabares MD  [facility] [place]

## 2018-02-07 NOTE — MR AVS SNAPSHOT
"              After Visit Summary   2018    Tanna Briones    MRN: [id number]           Patient Information     Date Of Birth          3/8/1934        Visit Information        Provider Department      2018 10:00 AM Bob Tabares MD [facility]        Today's Diagnoses     Bacterial vaginosis    -  1       Follow-ups after your visit        Who to contact     If you have questions or need follow up information about today's clinic visit or your schedule please contact [facility] directly at [phone].  Normal or non-critical lab and imaging results will be communicated to you by CodeBabyhart, letter or phone within 4 business days after the clinic has received the results. If you do not hear from us within 7 days, please contact the clinic through CodeBabyhart or phone. If you have a critical or abnormal lab result, we will notify you by phone as soon as possible.  Submit refill requests through TVPage or call your pharmacy and they will forward the refill request to us. Please allow 3 business days for your refill to be completed.          Additional Information About Your Visit        MyChart Information     TVPage lets you send messages to your doctor, view your test results, renew your prescriptions, schedule appointments and more. To sign up, go to [email or web address] . Click on \"Log in\" on the left side of the screen, which will take you to the Welcome page. Then click on \"Sign up Now\" on the right side of the page.     You will be asked to enter the access code listed below, as well as some personal information. Please follow the directions to create your username and password.     Your access code is: [email or web address]  Expires: 3/19/2018 11:03 AM     Your access code will  in 90 days. If you need help or a new code, please call your [place] clinic or [phone].        Care EveryWhere ID     This is your Care EveryWhere ID. This could be used by " "other organizations to access your [place] medical records  [id number]        Your Vitals Were     Pulse Height BMI (Body Mass Index)             72 5' 2.25\" (1.581 m) 21.41 kg/m2          Blood Pressure from Last 3 Encounters:   02/07/18 134/76   12/19/17 142/64   08/22/17 176/78    Weight from Last 3 Encounters:   02/07/18 118 lb (53.5 kg)   12/19/17 115 lb (52.2 kg)   08/22/17 120 lb (54.4 kg)              We Performed the Following     Genital Culture Aerobic Bacterial     Gram stain          Today's Medication Changes          These changes are accurate as of 2/7/18 10:43 AM.  If you have any questions, ask your nurse or doctor.               These medicines have changed or have updated prescriptions.        Dose/Directions    metoprolol succinate 25 MG 24 hr tablet   Commonly known as:  TOPROL-XL   This may have changed:  Another medication with the same name was removed. Continue taking this medication, and follow the directions you see here.   Changed by:  Bob Tabares MD        Dose:  25 mg   Take 25 mg by mouth   Refills:  0                Primary Care Provider Office Phone # Fax #    Christiano Veronika Clark [phone]       [facility] [address]  [place] [id number]        Equal Access to Services     EMANI SARABIA AH: Kosta coelloo Sorandy, waaxda luqadaha, qaybta kaalmada adeegyada, steve prakash. So [facility] [phone].    ATENCIÓN: Si habla español, tiene a maddox disposición servicios gratuitos de asistencia lingüística. Llame al [phone].    We comply with applicable federal civil rights laws and [place] laws. We do not discriminate on the basis of race, color, national origin, age, disability, sex, sexual orientation, or gender identity.            Thank you!     Thank you for choosing [facility]  for your care. Our goal is always to provide you with excellent care. Hearing back from our patients is one way we can " continue to improve our services. Please take a few minutes to complete the written survey that you may receive in the mail after your visit with us. Thank you!             Your Updated Medication List - Protect others around you: Learn how to safely use, store and throw away your medicines at www.disposemymeds.org.          This list is accurate as of 2/7/18 10:43 AM.  Always use your most recent med list.                   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 Start 3 days before surgery.        levothyroxine 25 MCG tablet    SYNTHROID/LEVOTHROID          metoprolol succinate 25 MG 24 hr tablet    TOPROL-XL     Take 25 mg by mouth        nystatin-triamcinolone cream    MYCOLOG II    15 g    Apply topically 2 times daily External use only.    Vulvar itching       prednisoLONE acetate 1 % ophthalmic susp    PRED FORTE     1 drop in surgical eye 4x/day for 1 wk, then 1 drop 2x/day for 7 wks. Start 3 days before surgery. SHAKE WELL        PROBIOTIC ACIDOPHILUS PO           simvastatin 20 MG tablet    ZOCOR          tobramycin 0.3 % ophthalmic solution    TOBREX     1 drop in surgical eye 4x/day for 1 wk, then 2x/day for 7 wks. Start 3 days before surgery        tretinoin 0.05 % cream    RETIN-A          VITAMIN D (CHOLECALCIFEROL) PO      Take 1,000 Units by mouth daily

## 2018-08-20 ENCOUNTER — OFFICE VISIT (OUTPATIENT)
Dept: OBGYN | Facility: CLINIC | Age: 83
End: 2018-08-20
Payer: MEDICARE

## 2018-08-20 VITALS — DIASTOLIC BLOOD PRESSURE: 74 MMHG | BODY MASS INDEX: 21.92 KG/M2 | SYSTOLIC BLOOD PRESSURE: 130 MMHG | WEIGHT: 120.8 LBS

## 2018-08-20 DIAGNOSIS — N64.4 BREAST TENDERNESS: Primary | ICD-10-CM

## 2018-08-20 PROCEDURE — 99212 OFFICE O/P EST SF 10 MIN: CPT | Performed by: OBSTETRICS & GYNECOLOGY

## 2018-08-20 NOTE — PROGRESS NOTES
SUBJECTIVE:                                                   Tanna Briones is a 84 year old female who presents to clinic today for the following health issue(s):  Patient presents with:  Consult: Patient would like to consult about menopausal issues, and also would like to have a breast check.       HPI: The patient is seen at this time for complaint of breast tenderness and a few hot flashes.  She has a long history of mild tenderness and no specific nodules or discharge.  She has had her ovaries out and has experienced a few hot flashes this year.  She is not tolerated estrogen in the past as she starts doing spotting.    No LMP recorded. Patient is postmenopausal..   Patient is not sexually active, .  Using menopause for contraception.    reports that she quit smoking about 58 years ago. Her smoking use included Cigarettes. She has never used smokeless tobacco.    STD testing offered?  Declined    Health maintenance updated:  yes    Today's PHQ-2 Score:   PHQ-2 (  [employer]) 3/30/2016   Q1: Little interest or pleasure in doing things 0   Q2: Feeling down, depressed or hopeless 0   PHQ-2 Score 0     Today's PHQ-9 Score:   PHQ-9 SCORE 2017   Total Score 0     Today's DEQUAN-7 Score:   DEQUAN-7 SCORE 2017   Total Score 0       Problem list and histories reviewed & adjusted, as indicated.  Additional history: as documented.    Patient Active Problem List   Diagnosis     Cervical cancer screening     Past Surgical History:   Procedure Laterality Date     LAPAROSCOPIC SALPINGO-OOPHORECTOMY Bilateral 2015    Procedure: LAPAROSCOPIC SALPINGO-OOPHORECTOMY;  Surgeon: Bob Tabares MD;  Location: [facility]     ORTHOPEDIC SURGERY      left hip replacement      Social History   Substance Use Topics     Smoking status: Former Smoker     Types: Cigarettes     Quit date: 4/15/1960     Smokeless tobacco: Never Used     Alcohol use 2.5 oz/week     5 Standard drinks or equivalent per week      *Family  history is unknown by patient.            Current Outpatient Prescriptions   Medication Sig     ALPRAZolam (XANAX) 0.25 MG tablet Take 0.25 mg by mouth     biotin 2.5 mg/mL Take by mouth daily     clindamycin (CLEOCIN) 300 MG capsule TK 1 C PO TID FOR 24 HOURS FOR DENTAL WORK     EPIPEN 2-SARAH 0.3 MG/0.3ML injection 2-pack as needed     ibuprofen (ADVIL/MOTRIN) 200 MG tablet Take 400 mg by mouth     Lactobacillus (PROBIOTIC ACIDOPHILUS PO)      levothyroxine (SYNTHROID/LEVOTHROID) 25 MCG tablet      metoprolol succinate (TOPROL-XL) 25 MG 24 hr tablet Take 75 mg by mouth      nystatin-triamcinolone (MYCOLOG II) cream Apply topically 2 times daily External use only.     simvastatin (ZOCOR) 20 MG tablet      tretinoin (RETIN-A) 0.05 % cream      VITAMIN D, CHOLECALCIFEROL, PO Take 1,000 Units by mouth daily     No current facility-administered medications for this visit.      Allergies   Allergen Reactions     Bees      Ciprofloxacin Hives     Iodine Other (See Comments)     Throat closing     Penicillins Hives     Remote hx of hives after 10 day course     Epinephrine Palpitations     Sulfa Drugs Rash     Sulfasalazine Rash       ROS:  12 point review of systems negative other than symptoms noted below.  Musculoskeletal: Height Loss    OBJECTIVE:     /74  Wt 120 lb 12.8 oz (54.8 kg)  Breastfeeding? No  BMI 21.92 kg/m2  Body mass index is 21.92 kg/(m^2).    Exam:  Constitutional:  Appearance: Well nourished, well developed alert, in no acute distress  Breasts:  Inspection of Breasts:  No lymphadenopathy present; Palpation of Breasts and Axillae:  No masses present on palpation, no breast tenderness Axillary Lymph Nodes:  No lymphadenopathy present     In-Clinic Test Results:      ASSESSMENT/PLAN:                                                        Patient with completely benign breast exam.  We discussed her menopausal symptoms and I do not feel that she is a good candidate for replacement therapy and she  will gladly continue on as she is at this point.  She had multiple questions about ongoing screening.  She requests a Pap smear in 2019 and understands that she will be outside of the [place] protocol.          Bob Tabares MD  [facility] [place]

## 2018-10-22 ENCOUNTER — TRANSFERRED RECORDS (OUTPATIENT)
Dept: HEALTH INFORMATION MANAGEMENT | Facility: CLINIC | Age: 83
End: 2018-10-22

## 2019-04-03 NOTE — PROGRESS NOTES
Tanna is a 85 year old  female who presents for Medicare Limited exam.     Do you have a Health Care Directive?: Yes: Patient states has Advance Directive and will bring in a copy to clinic.    Fall risk:   Fallen 2 or more times in the past year?: No  Any fall with injury in the past year?: No    HPI : The patient is seen at this time for her annual exam.  She is emotionally distraught at this time as her  is failing in hospice and memory care.  Physically and emotionally she is doing well and is still working and living independently in her home.      GYNECOLOGIC HISTORY:  No LMP recorded. Patient is postmenopausal..   reports that she quit smoking about 59 years ago. Her smoking use included cigarettes. She has never used smokeless tobacco.    STD testing offered?  Declined  Last PHQ-9 score on record=   PHQ-9 SCORE 2019   PHQ-9 Total Score 0     Last GAD7 score on record=   DEQUAN-7 SCORE 2017   Total Score 0 0       HEALTH MAINTENANCE:  Cholesterol:   Cholesterol   Date Value Ref Range Status   10/19/2015 229 (A) 115 - 199 mg/dL Final      Last Mammo: 10/22/18, Result: normal, Next Mammo: 10/2019   Pap:   Lab Results   Component Value Date    PAP NIL HPV- 2017    PAP NIL 2016      DEXA:  Declines treatment, no longer will do DEXAs  Colonoscopy:  , Result:  normal, Next Colonoscopy: no further needed.    HISTORY:  OB History    Para Term  AB Living   3 3 3 0 0 3   SAB TAB Ectopic Multiple Live Births   0 0 0 0 0      # Outcome Date GA Lbr Robinson/2nd Weight Sex Delivery Anes PTL Lv   3 Term            2 Term            1 Term              Past Medical History:   Diagnosis Date     Mild aortic insufficiency      Mild hypercholesterolemia      Squamous cell carcinoma of leg      Past Surgical History:   Procedure Laterality Date     LAPAROSCOPIC SALPINGO-OOPHORECTOMY Bilateral 2015    Procedure: LAPAROSCOPIC SALPINGO-OOPHORECTOMY;  Surgeon: Rayshawn  Bob CORTEZ MD;  Location: [facility]     ORTHOPEDIC SURGERY      left hip replacement     Family History   Family history unknown: Yes     Social History     Socioeconomic History     Marital status:      Spouse name: Not on file     Number of children: Not on file     Years of education: Not on file     Highest education level: Not on file   Occupational History     Not on file   Social Needs     Financial resource strain: Not on file     Food insecurity:     Worry: Not on file     Inability: Not on file     Transportation needs:     Medical: Not on file     Non-medical: Not on file   Tobacco Use     Smoking status: Former Smoker     Types: Cigarettes     Last attempt to quit: 4/15/1960     Years since quittin.0     Smokeless tobacco: Never Used   Substance and Sexual Activity     Alcohol use: Yes     Alcohol/week: 2.5 oz     Types: 5 Standard drinks or equivalent per week     Drug use: No     Sexual activity: Not Currently     Partners: Male     Birth control/protection: Post-menopausal   Lifestyle     Physical activity:     Days per week: Not on file     Minutes per session: Not on file     Stress: Not on file   Relationships     Social connections:     Talks on phone: Not on file     Gets together: Not on file     Attends Anabaptism service: Not on file     Active member of club or organization: Not on file     Attends meetings of clubs or organizations: Not on file     Relationship status: Not on file     Intimate partner violence:     Fear of current or ex partner: Not on file     Emotionally abused: Not on file     Physically abused: Not on file     Forced sexual activity: Not on file   Other Topics Concern     Parent/sibling w/ CABG, MI or angioplasty before 65F 55M? Not Asked   Social History Narrative     Not on file     Current Outpatient Medications   Medication Sig     ALPRAZolam (XANAX) 0.25 MG tablet Take 0.25 mg by mouth     biotin 2.5 mg/mL Take by mouth daily     Lactobacillus (PROBIOTIC  "ACIDOPHILUS PO)      levothyroxine (SYNTHROID/LEVOTHROID) 25 MCG tablet      metoprolol succinate (TOPROL-XL) 25 MG 24 hr tablet Take 75 mg by mouth      simvastatin (ZOCOR) 20 MG tablet      tretinoin (RETIN-A) 0.05 % cream      VITAMIN D, CHOLECALCIFEROL, PO Take 1,000 Units by mouth daily     clindamycin (CLEOCIN) 300 MG capsule TK 1 C PO TID FOR 24 HOURS FOR DENTAL WORK     EPIPEN 2-SARAH 0.3 MG/0.3ML injection 2-pack as needed     ibuprofen (ADVIL/MOTRIN) 200 MG tablet Take 400 mg by mouth     No current facility-administered medications for this visit.      Allergies   Allergen Reactions     Bees      Ciprofloxacin Hives     Iodine Other (See Comments)     Throat closing     Penicillins Hives     Remote hx of hives after 10 day course     Epinephrine Palpitations     Sulfa Drugs Rash     Sulfasalazine Rash       Past medical, surgical, social and family history were reviewed and updated in EPIC.    EXAM:  /68   Pulse 68   Ht 1.562 m (5' 1.5\")   Wt 54 kg (119 lb)   BMI 22.12 kg/m     BMI: Body mass index is 22.12 kg/m .    Constitutional: Appearance: Well nourished, well developed alert, in no acute distress  Breasts: Inspection of Breasts:  No lymphadenopathy present    Palpation of Breasts and Axillae:  No masses present on palpation, no  breast tenderness    Axillary Lymph Nodes:  No lymphadenopathy present  Neurologic/Psychiatric:    Mental Status:  Oriented X3     Pelvic Exam:  External Genitalia:     Normal appearance for age, no discharge present, no tenderness present, no inflammatory lesions present, color normal  Vagina:     Normal vaginal vault without central or paravaginal defects, ATROPHIC  Bladder:     Nontender to palpation  Urethra:   Urethral Body:  Urethra palpation normal, urethra structural support normal   Urethral Meatus:  No erythema or lesions present  Cervix:     Appearance healthy, no lesions present, nontender to palpation, no bleeding present  Uterus:     Nontender to " palpation, no masses present, position anteflexed, mobility: normal  Adnexa:     No adnexal tenderness present, no adnexal masses present  Perineum:     Perineum within normal limits, no evidence of trauma, no rashes or skin lesions present  Inguinal Lymph Nodes:     No lymphadenopathy present      Body mass index is 22.12 kg/m .     reports that she quit smoking about 59 years ago. Her smoking use included cigarettes. She has never used smokeless tobacco.      ASSESSMENT:  85 year old female with satisfactory annual exam.    ICD-10-CM    1. Encounter for gynecological examination without abnormal finding Z01.419 Pap imaged thin layer screen with HPV - recommended age 30 - 65     HPV High Risk Types DNA Cervical     Medicare Limited Visit       COUNSELING:   Reviewed preventive health counseling, as reflected in patient instructions       Regular exercise       Healthy diet/nutrition    PLAN/PATIENT INSTRUCTIONS:    The patient's examination is adequate at this time.  I believe that she is doing very well for her age.    Bob Tabares MD

## 2019-04-24 ENCOUNTER — OFFICE VISIT (OUTPATIENT)
Dept: OBGYN | Facility: CLINIC | Age: 84
End: 2019-04-24
Payer: MEDICARE

## 2019-04-24 VITALS
DIASTOLIC BLOOD PRESSURE: 68 MMHG | WEIGHT: 119 LBS | HEART RATE: 68 BPM | HEIGHT: 62 IN | SYSTOLIC BLOOD PRESSURE: 118 MMHG | BODY MASS INDEX: 21.9 KG/M2

## 2019-04-24 DIAGNOSIS — Z01.419 ENCOUNTER FOR GYNECOLOGICAL EXAMINATION WITHOUT ABNORMAL FINDING: Primary | ICD-10-CM

## 2019-04-24 PROCEDURE — G0145 SCR C/V CYTO,THINLAYER,RESCR: HCPCS | Performed by: OBSTETRICS & GYNECOLOGY

## 2019-04-24 PROCEDURE — G0476 HPV COMBO ASSAY CA SCREEN: HCPCS | Performed by: OBSTETRICS & GYNECOLOGY

## 2019-04-24 PROCEDURE — 87624 HPV HI-RISK TYP POOLED RSLT: CPT | Performed by: OBSTETRICS & GYNECOLOGY

## 2019-04-24 PROCEDURE — G0101 CA SCREEN;PELVIC/BREAST EXAM: HCPCS | Performed by: OBSTETRICS & GYNECOLOGY

## 2019-04-24 ASSESSMENT — MIFFLIN-ST. JEOR: SCORE: 930.09

## 2019-04-24 ASSESSMENT — ANXIETY QUESTIONNAIRES
GAD7 TOTAL SCORE: 0
6. BECOMING EASILY ANNOYED OR IRRITABLE: NOT AT ALL
5. BEING SO RESTLESS THAT IT IS HARD TO SIT STILL: NOT AT ALL
7. FEELING AFRAID AS IF SOMETHING AWFUL MIGHT HAPPEN: NOT AT ALL
3. WORRYING TOO MUCH ABOUT DIFFERENT THINGS: NOT AT ALL
1. FEELING NERVOUS, ANXIOUS, OR ON EDGE: NOT AT ALL
2. NOT BEING ABLE TO STOP OR CONTROL WORRYING: NOT AT ALL

## 2019-04-24 ASSESSMENT — PATIENT HEALTH QUESTIONNAIRE - PHQ9
SUM OF ALL RESPONSES TO PHQ QUESTIONS 1-9: 0
5. POOR APPETITE OR OVEREATING: NOT AT ALL

## 2019-04-24 NOTE — LETTER
May 3, 2019      Tanna R Anselmo  [address] [address]    Dear ,      I am happy to inform you that your cervical cancer screening test (PAP smear) was normal and your Human Papillomavirus (HPV) test was negative.    Per current guidelines, you no longer need to have pap smears completed.     Please continue to be seen every year for an annual wellness visit and other preventative tests.     If you have additional questions regarding this result, please call our registered nurse, [place] at [phone].    Sincerely,      Bob Tabares MD/leticia

## 2019-04-25 ASSESSMENT — ANXIETY QUESTIONNAIRES: GAD7 TOTAL SCORE: 0

## 2019-04-29 LAB
COPATH REPORT: NORMAL
PAP: NORMAL

## 2019-04-30 LAB
FINAL DIAGNOSIS: NORMAL
HPV HR 12 DNA CVX QL NAA+PROBE: NEGATIVE
HPV16 DNA SPEC QL NAA+PROBE: NEGATIVE
HPV18 DNA SPEC QL NAA+PROBE: NEGATIVE
SPECIMEN DESCRIPTION: NORMAL
SPECIMEN SOURCE CVX/VAG CYTO: NORMAL

## 2019-05-03 PROBLEM — Z12.4 CERVICAL CANCER SCREENING: Status: ACTIVE | Noted: 2017-05-04

## 2019-07-11 ENCOUNTER — OFFICE VISIT (OUTPATIENT)
Dept: PLASTIC SURGERY | Facility: CLINIC | Age: 84
End: 2019-07-11

## 2019-07-11 DIAGNOSIS — Z41.1 ENCOUNTER FOR COSMETIC PROCEDURE: Primary | ICD-10-CM

## 2019-07-11 NOTE — NURSING NOTE
Chief Complaint   Patient presents with     RECHECK     botox     Obtained consent for Botulinum Toxin.  Discussed possible side effects including but not limited to unwanted facial weakness or paralysis, bruising, swelling, redness, pain, upper eyelid ptosis.  Botox will begin to work in 4 - 5 days, fully working by 10 days.  Botox lasts for approximately 3 months.      Prepped skin with isopropyl alcohol and gauze, intermittent ice for comfort.  Patient tolerated procedure well.    Patient to follow up as needed.   Samira Brown, Patient Coordinator

## 2019-07-11 NOTE — LETTER
July 11, 2019  Re: Tanna Briones  03/08/1934    Dear Dr. Mccollum,    Thank you so much for referring Tanna Briones to the [facility]. I had the pleasure of visiting with Tanna today.     Attached you will find a copy of my note. Please feel free to reach out to me with any questions, [phone].     This office note has been dictated.       Your trust in our practice and care is much appreciated.    Sincerely,  JYOTI MOBLEY MD

## 2019-07-11 NOTE — LETTER
2019       RE: Mason Michelle  [address] [address]     Dear Colleague,    Thank you for referring your patient, Mason Michelle, to the [facility] [place] [facility] at [facility]. Please see a copy of my visit note below.    Service Date: 2019      HISTORY OF PRESENT ILLNESS:   Ms. Michelle is in today.  She notes a little bit of ptosis on the right lid.  The question is whether botox is an ongoing good choice for her.  Ptosis is a natural process for many people and is unrelated to botox use so I do not think that is a contraindication, particularly since we would just treat the  areas.  We talked about how you can get pseudoptosis from dissent of the brow if one aggressively treats the frontalis, but we have not been inclined to do that.       PROCEDURE:  I injected 20 units of botox into the -procerus area.        ASSESSMENT AND PLAN:  Her  has had progressive dementia and that creates a challenge, but she has a good support system.         JYOTI MOBLEY MD          D: 2019   T: 2019   MT: ms      Name:     MASON MICHELLE   MRN:      [id number]        Account:      [id number]   :      1934           Service Date: 2019      Document: [id number]

## 2019-07-12 NOTE — PROGRESS NOTES
Service Date: 2019      HISTORY OF PRESENT ILLNESS:   Ms. Michelle is in today.  She notes a little bit of ptosis on the right lid.  The question is whether botox is an ongoing good choice for her.  Ptosis is a natural process for many people and is unrelated to botox use so I do not think that is a contraindication, particularly since we would just treat the  areas.  We talked about how you can get pseudoptosis from dissent of the brow if one aggressively treats the frontalis, but we have not been inclined to do that.       PROCEDURE:  I injected 20 units of botox into the -procerus area.        ASSESSMENT AND PLAN:  Her  has had progressive dementia and that creates a challenge, but she has a good support system.         JYOTI MOBLEY MD             D: 2019   T: 2019   MT: ms      Name:     MASON MICHELLE   MRN:      [id number]        Account:      [id number]   :      1934           Service Date: 2019      Document: [id number]

## 2019-12-20 ENCOUNTER — HOSPITAL ENCOUNTER (OUTPATIENT)
Dept: GENERAL RADIOLOGY | Facility: CLINIC | Age: 84
Discharge: HOME | End: 2019-12-20
Attending: INTERNAL MEDICINE | Admitting: INTERNAL MEDICINE
Payer: MEDICARE

## 2019-12-20 DIAGNOSIS — R13.19 ESOPHAGEAL DYSPHAGIA: ICD-10-CM

## 2019-12-20 PROCEDURE — 25500045 ZZH RX 255: Performed by: RADIOLOGY

## 2019-12-20 PROCEDURE — 74220 X-RAY XM ESOPHAGUS 1CNTRST: CPT

## 2019-12-20 RX ADMIN — ANTACID/ANTIFLATULENT 4 G: 380; 550; 10; 10 GRANULE, EFFERVESCENT ORAL at 10:45

## 2023-04-07 LAB — EJECTION FRACTION: 71 %

## 2023-05-09 ENCOUNTER — OFFICE VISIT (OUTPATIENT)
Dept: OBGYN | Facility: CLINIC | Age: 88
End: 2023-05-09
Payer: MEDICARE

## 2023-05-09 VITALS — BODY MASS INDEX: 22.12 KG/M2 | SYSTOLIC BLOOD PRESSURE: 120 MMHG | WEIGHT: 119 LBS | DIASTOLIC BLOOD PRESSURE: 60 MMHG

## 2023-05-09 DIAGNOSIS — Z71.1 WORRIED WELL: Primary | ICD-10-CM

## 2023-05-09 PROCEDURE — 99202 OFFICE O/P NEW SF 15 MIN: CPT | Performed by: NURSE PRACTITIONER

## 2023-05-09 NOTE — PROGRESS NOTES
SUBJECTIVE:                                                   Tanna Briones is a 89 year old female who presents to clinic today for the following health issue(s):  Patient presents with:  Vaginal Problem      Additional information: Pt reports lump on chela    HPI:  Pt here today with concerns of a left labia bump    No LMP recorded. Patient is postmenopausal..   Using menopause for contraception.    reports that she quit smoking about 63 years ago. Her smoking use included cigarettes. She has never used smokeless tobacco.        Health maintenance updated:     Today's PHQ-2 Score:       5/10/2023     7:52 AM   PHQ-2 (  [employer])   Q1: Little interest or pleasure in doing things 0   Q2: Feeling down, depressed or hopeless 0   PHQ-2 Score 0     Today's PHQ-9 Score:       5/10/2023     7:52 AM   PHQ-9 SCORE   PHQ-9 Total Score 0     Today's DEQUAN-7 Score:       5/10/2023     7:52 AM   DEQUAN-7 SCORE   Total Score 0       Problem list and histories reviewed & adjusted, as indicated.  Additional history: as documented.    Patient Active Problem List   Diagnosis     Cervical cancer screening     Past Surgical History:   Procedure Laterality Date     LAPAROSCOPIC SALPINGO-OOPHORECTOMY Bilateral 2015    Procedure: LAPAROSCOPIC SALPINGO-OOPHORECTOMY;  Surgeon: Bob Tabares MD;  Location: [facility]     ORTHOPEDIC SURGERY      left hip replacement      Social History     Tobacco Use     Smoking status: Former     Types: Cigarettes     Quit date: 4/15/1960     Years since quittin.1     Smokeless tobacco: Never   Vaping Use     Vaping status: Never Used   Substance Use Topics     Alcohol use: Yes     Alcohol/week: 4.2 standard drinks of alcohol     Types: 5 Standard drinks or equivalent per week      *Family history is unknown by patient.            Current Outpatient Medications   Medication Sig     ALPRAZolam (XANAX) 0.25 MG tablet Take 0.25 mg by mouth     biotin 2.5 mg/mL Take by mouth daily     clindamycin  (CLEOCIN) 300 MG capsule TK 1 C PO TID FOR 24 HOURS FOR DENTAL WORK     EPIPEN 2-SARAH 0.3 MG/0.3ML injection 2-pack as needed     ibuprofen (ADVIL/MOTRIN) 200 MG tablet Take 400 mg by mouth     Lactobacillus (PROBIOTIC ACIDOPHILUS PO)      levothyroxine (SYNTHROID/LEVOTHROID) 25 MCG tablet      metoprolol succinate (TOPROL-XL) 25 MG 24 hr tablet Take 75 mg by mouth      simvastatin (ZOCOR) 20 MG tablet      tretinoin (RETIN-A) 0.05 % cream      VITAMIN D, CHOLECALCIFEROL, PO Take 1,000 Units by mouth daily     No current facility-administered medications for this visit.     Allergies   Allergen Reactions     Bees      Ciprofloxacin Hives     Iodine Other (See Comments)     Throat closing     Penicillins Hives     Remote hx of hives after 10 day course     Epinephrine Palpitations     Sulfa Antibiotics Rash     Sulfasalazine Rash       ROS:  Gastrointestinal: lump on chela   No urinary frequency or dysuria, bladder or kidney problems      OBJECTIVE:     /60   Wt 54 kg (119 lb)   BMI 22.12 kg/m    Body mass index is 22.12 kg/m .    Exam:  Constitutional:  Appearance: Well nourished, well developed alert, in no acute distress  Psychiatric:  Mentation appears normal and affect normal/bright.  Pelvic Exam:  External Genitalia:     Normal appearance for age, no discharge present, no tenderness present, no inflammatory lesions present, color normal  Perineum:     Perineum within normal limits, no evidence of trauma, no rashes or skin lesions present  Anus:     Anus within normal limits, no hemorrhoids present  Inguinal Lymph Nodes:     No lymphadenopathy present  Pubic Hair:     Normal pubic hair distribution for age  Genitalia and Groin:     No rashes present, no lesions present, no areas of discoloration, no masses present       In-Clinic Test Results:  No results found for this or any previous visit (from the past 24 hour(s)).    ASSESSMENT/PLAN:                                                        ICD-10-CM     1. Worried well  Z71.1           There are no Patient Instructions on file for this visit.    Worried well. Return PRN    Justina Moreno, APRN CNP  [facility]

## 2023-05-10 ASSESSMENT — ANXIETY QUESTIONNAIRES
2. NOT BEING ABLE TO STOP OR CONTROL WORRYING: NOT AT ALL
3. WORRYING TOO MUCH ABOUT DIFFERENT THINGS: NOT AT ALL
7. FEELING AFRAID AS IF SOMETHING AWFUL MIGHT HAPPEN: NOT AT ALL
GAD7 TOTAL SCORE: 0
1. FEELING NERVOUS, ANXIOUS, OR ON EDGE: NOT AT ALL
6. BECOMING EASILY ANNOYED OR IRRITABLE: NOT AT ALL
GAD7 TOTAL SCORE: 0
IF YOU CHECKED OFF ANY PROBLEMS ON THIS QUESTIONNAIRE, HOW DIFFICULT HAVE THESE PROBLEMS MADE IT FOR YOU TO DO YOUR WORK, TAKE CARE OF THINGS AT HOME, OR GET ALONG WITH OTHER PEOPLE: NOT DIFFICULT AT ALL
5. BEING SO RESTLESS THAT IT IS HARD TO SIT STILL: NOT AT ALL

## 2023-05-10 ASSESSMENT — PATIENT HEALTH QUESTIONNAIRE - PHQ9
5. POOR APPETITE OR OVEREATING: NOT AT ALL
SUM OF ALL RESPONSES TO PHQ QUESTIONS 1-9: 0

## 2023-06-01 ENCOUNTER — TRANSFERRED RECORDS (OUTPATIENT)
Dept: HEALTH INFORMATION MANAGEMENT | Facility: CLINIC | Age: 88
End: 2023-06-01
Payer: MEDICARE

## 2023-06-19 ENCOUNTER — PREP FOR PROCEDURE (OUTPATIENT)
Dept: OBGYN | Facility: CLINIC | Age: 88
End: 2023-06-19
Payer: MEDICARE

## 2023-06-19 ENCOUNTER — TRANSFERRED RECORDS (OUTPATIENT)
Dept: MULTI SPECIALTY CLINIC | Facility: CLINIC | Age: 88
End: 2023-06-19

## 2023-06-19 LAB
ALT SERPL-CCNC: 17 IU/L (ref 10–35)
AST SERPL-CCNC: 34 IU/L (ref 10–35)
CREATININE (EXTERNAL): 0.76 MG/DL (ref 0.5–0.9)
GFR ESTIMATED (EXTERNAL): 75 ML/MIN/1.73M2
GLUCOSE (EXTERNAL): 82 MG/DL (ref 70–99)
POTASSIUM (EXTERNAL): 4.1 MMOL/L (ref 3.5–5.1)

## 2023-06-19 RX ORDER — ACETAMINOPHEN 325 MG/1
975 TABLET ORAL ONCE
Status: CANCELLED | OUTPATIENT
Start: 2023-06-19 | End: 2023-06-19

## 2023-06-20 RX ORDER — ACYCLOVIR 400 MG/1
400 TABLET ORAL
COMMUNITY

## 2023-06-20 RX ORDER — TRIAMCINOLONE ACETONIDE 1 MG/G
CREAM TOPICAL 2 TIMES DAILY
COMMUNITY

## 2023-06-20 RX ORDER — CALCIUM CARBONATE 500(1250)
1 TABLET ORAL 2 TIMES DAILY
COMMUNITY

## 2023-06-20 RX ORDER — POLYETHYLENE GLYCOL 3350 17 G/17G
1 POWDER, FOR SOLUTION ORAL DAILY
COMMUNITY

## 2023-06-20 RX ORDER — ROSUVASTATIN CALCIUM 10 MG/1
10 TABLET, COATED ORAL DAILY
COMMUNITY

## 2023-06-20 RX ORDER — BIOTIN 1 MG
6000 TABLET ORAL 2 TIMES DAILY
COMMUNITY

## 2023-06-20 RX ORDER — ESCITALOPRAM OXALATE 10 MG/1
10 TABLET ORAL DAILY
COMMUNITY

## 2023-06-21 ENCOUNTER — ANESTHESIA EVENT (OUTPATIENT)
Dept: SURGERY | Facility: CLINIC | Age: 88
End: 2023-06-21
Payer: MEDICARE

## 2023-06-21 ASSESSMENT — ENCOUNTER SYMPTOMS: DYSRHYTHMIAS: 1

## 2023-06-21 NOTE — ANESTHESIA PREPROCEDURE EVALUATION
Anesthesia Pre-Procedure Evaluation    Patient: Tanna Briones   MRN: [id number] : 3/8/1934        Procedure : Procedure(s):  CERVICAL DILATION, HYSTEROSCOPY, POLYPECTOMY          Past Medical History:   Diagnosis Date     Anxiety      Aortic insufficiency      Depression      HLD (hyperlipidemia)      HTN (hypertension)      Hypothyroidism      Mild aortic insufficiency      Mild hypercholesterolemia      Paroxysmal supraventricular tachycardia (H)      Primary osteoarthritis of left hip      Squamous cell carcinoma of leg      Tinnitus, left       Past Surgical History:   Procedure Laterality Date     CATARACT EXTRACTION       FACIAL COSMETIC SURGERY       JOINT REPLACEMENT, HIP RT/LT Right      LAPAROSCOPIC SALPINGO-OOPHORECTOMY Bilateral 2015    Procedure: LAPAROSCOPIC SALPINGO-OOPHORECTOMY;  Surgeon: Bob Tabares MD;  Location: [facility]     ORTHOPEDIC SURGERY      left hip replacement     TONSILLECTOMY & ADENOIDECTOMY       WRIST RECONSTRUCTION Left       Allergies   Allergen Reactions     Bees      Cefuroxime Hives     Ciprofloxacin Hives     Iodine Other (See Comments)     Throat closing     Penicillins Hives     Remote hx of hives after 10 day course     Epinephrine Palpitations     Sulfa Antibiotics Rash     Sulfasalazine Rash      Social History     Tobacco Use     Smoking status: Former     Years: 60.50     Types: Cigarettes     Quit date: 4/15/1960     Years since quittin.2     Smokeless tobacco: Never   Substance Use Topics     Alcohol use: Yes     Alcohol/week: 4.2 standard drinks of alcohol     Types: 5 Standard drinks or equivalent per week      Wt Readings from Last 1 Encounters:   23 54 kg (119 lb)        Anesthesia Evaluation            ROS/MED HX  ENT/Pulmonary:    (-) sleep apnea   Neurologic:       Cardiovascular:     (+) Dyslipidemia hypertension--CAD ---dysrhythmias, Other, Irregular Heartbeat/Palpitations, valvular problems/murmurs type: AI and MR Mild per last TTE.      METS/Exercise Tolerance:     Hematologic:       Musculoskeletal:       GI/Hepatic:    (-) GERD   Renal/Genitourinary:       Endo:     (+) thyroid problem, hypothyroidism,     Psychiatric/Substance Use:     (+) psychiatric history anxiety and depression     Infectious Disease:       Malignancy:       Other:            Physical Exam    Airway        Mallampati: II   TM distance: > 3 FB   Neck ROM: full   Mouth opening: > 3 cm    Respiratory Devices and Support         Dental       (+) Modest Abnormalities - crowns, retainers, 1 or 2 missing teeth      Cardiovascular   cardiovascular exam normal          Pulmonary   pulmonary exam normal                OUTSIDE LABS:  CBC:   Lab Results   Component Value Date    HGB 11.9 11/18/2015     BMP:   Lab Results   Component Value Date    POTASSIUM 4.1 10/19/2015    CR 0.77 10/19/2015    GLC 88 10/19/2015     COAGS: No results found for: PTT, INR, FIBR  POC: No results found for: BGM, HCG, HCGS  HEPATIC:   Lab Results   Component Value Date    ALT 21 10/19/2015    AST 34 10/19/2015     OTHER:   Lab Results   Component Value Date    TSH 2.84 10/19/2015       Anesthesia Plan    ASA Status:  3   NPO Status:  NPO Appropriate    Anesthesia Type: General.     - Airway: LMA   Induction: Intravenous, Propofol.   Maintenance: Balanced.        Consents    Anesthesia Plan(s) and associated risks, benefits, and realistic alternatives discussed. Questions answered and patient/representative(s) expressed understanding.    - Discussed:     - Discussed with:  Patient         Postoperative Care    Pain management: IV analgesics.   PONV prophylaxis: Ondansetron (or other 5HT-3), Background Propofol Infusion     Comments:    Other Comments: No versed            Ivan James DO, DO

## 2023-06-22 ENCOUNTER — ANESTHESIA (OUTPATIENT)
Dept: SURGERY | Facility: CLINIC | Age: 88
End: 2023-06-22
Payer: MEDICARE

## 2023-06-22 ENCOUNTER — APPOINTMENT (OUTPATIENT)
Dept: ULTRASOUND IMAGING | Facility: CLINIC | Age: 88
End: 2023-06-22
Attending: OBSTETRICS & GYNECOLOGY
Payer: MEDICARE

## 2023-06-22 ENCOUNTER — HOSPITAL ENCOUNTER (OUTPATIENT)
Facility: CLINIC | Age: 88
Discharge: HOME | End: 2023-06-22
Attending: OBSTETRICS & GYNECOLOGY | Admitting: OBSTETRICS & GYNECOLOGY
Payer: MEDICARE

## 2023-06-22 VITALS
RESPIRATION RATE: 16 BRPM | HEART RATE: 65 BPM | WEIGHT: 120.1 LBS | TEMPERATURE: 96.8 F | HEIGHT: 62 IN | DIASTOLIC BLOOD PRESSURE: 79 MMHG | SYSTOLIC BLOOD PRESSURE: 172 MMHG | BODY MASS INDEX: 22.1 KG/M2 | OXYGEN SATURATION: 98 %

## 2023-06-22 DIAGNOSIS — Z12.4 CERVICAL CANCER SCREENING: Primary | ICD-10-CM

## 2023-06-22 PROCEDURE — 88305 TISSUE EXAM BY PATHOLOGIST: CPT | Mod: TC | Performed by: OBSTETRICS & GYNECOLOGY

## 2023-06-22 PROCEDURE — 710N000009 HC RECOVERY PHASE 1, LEVEL 1, PER MIN: Performed by: OBSTETRICS & GYNECOLOGY

## 2023-06-22 PROCEDURE — 250N000011 HC RX IP 250 OP 636: Mod: JZ | Performed by: NURSE ANESTHETIST, CERTIFIED REGISTERED

## 2023-06-22 PROCEDURE — 360N000076 HC SURGERY LEVEL 3, PER MIN: Performed by: OBSTETRICS & GYNECOLOGY

## 2023-06-22 PROCEDURE — 258N000003 HC RX IP 258 OP 636: Performed by: NURSE ANESTHETIST, CERTIFIED REGISTERED

## 2023-06-22 PROCEDURE — 272N000001 HC OR GENERAL SUPPLY STERILE: Performed by: OBSTETRICS & GYNECOLOGY

## 2023-06-22 PROCEDURE — 88305 TISSUE EXAM BY PATHOLOGIST: CPT | Mod: 26 | Performed by: STUDENT IN AN ORGANIZED HEALTH CARE EDUCATION/TRAINING PROGRAM

## 2023-06-22 PROCEDURE — 250N000013 HC RX MED GY IP 250 OP 250 PS 637: Performed by: OBSTETRICS & GYNECOLOGY

## 2023-06-22 PROCEDURE — 250N000009 HC RX 250: Performed by: NURSE ANESTHETIST, CERTIFIED REGISTERED

## 2023-06-22 PROCEDURE — 999N000141 HC STATISTIC PRE-PROCEDURE NURSING ASSESSMENT: Performed by: OBSTETRICS & GYNECOLOGY

## 2023-06-22 PROCEDURE — 258N000001 HC RX 258: Performed by: OBSTETRICS & GYNECOLOGY

## 2023-06-22 PROCEDURE — C1888 ENDOVAS NON-CARDIAC ABL CATH: HCPCS | Performed by: OBSTETRICS & GYNECOLOGY

## 2023-06-22 PROCEDURE — 370N000017 HC ANESTHESIA TECHNICAL FEE, PER MIN: Performed by: OBSTETRICS & GYNECOLOGY

## 2023-06-22 PROCEDURE — 250N000025 HC SEVOFLURANE, PER MIN: Performed by: OBSTETRICS & GYNECOLOGY

## 2023-06-22 PROCEDURE — 999N000063 US INTRAOPERATIVE: Mod: TC

## 2023-06-22 PROCEDURE — 710N000012 HC RECOVERY PHASE 2, PER MINUTE: Performed by: OBSTETRICS & GYNECOLOGY

## 2023-06-22 RX ORDER — HYDROMORPHONE HCL IN WATER/PF 6 MG/30 ML
0.2 PATIENT CONTROLLED ANALGESIA SYRINGE INTRAVENOUS EVERY 5 MIN PRN
Status: DISCONTINUED | OUTPATIENT
Start: 2023-06-22 | End: 2023-06-22 | Stop reason: HOSPADM

## 2023-06-22 RX ORDER — LIDOCAINE HYDROCHLORIDE 20 MG/ML
INJECTION, SOLUTION INFILTRATION; PERINEURAL PRN
Status: DISCONTINUED | OUTPATIENT
Start: 2023-06-22 | End: 2023-06-22

## 2023-06-22 RX ORDER — PROPOFOL 10 MG/ML
INJECTION, EMULSION INTRAVENOUS PRN
Status: DISCONTINUED | OUTPATIENT
Start: 2023-06-22 | End: 2023-06-22

## 2023-06-22 RX ORDER — IBUPROFEN 600 MG/1
600 TABLET, FILM COATED ORAL ONCE
Status: DISCONTINUED | OUTPATIENT
Start: 2023-06-22 | End: 2023-06-22 | Stop reason: HOSPADM

## 2023-06-22 RX ORDER — ONDANSETRON 2 MG/ML
INJECTION INTRAMUSCULAR; INTRAVENOUS PRN
Status: DISCONTINUED | OUTPATIENT
Start: 2023-06-22 | End: 2023-06-22

## 2023-06-22 RX ORDER — FENTANYL CITRATE 50 UG/ML
INJECTION, SOLUTION INTRAMUSCULAR; INTRAVENOUS PRN
Status: DISCONTINUED | OUTPATIENT
Start: 2023-06-22 | End: 2023-06-22

## 2023-06-22 RX ORDER — PROPOFOL 10 MG/ML
INJECTION, EMULSION INTRAVENOUS CONTINUOUS PRN
Status: DISCONTINUED | OUTPATIENT
Start: 2023-06-22 | End: 2023-06-22

## 2023-06-22 RX ORDER — ONDANSETRON 4 MG/1
4 TABLET, ORALLY DISINTEGRATING ORAL EVERY 30 MIN PRN
Status: DISCONTINUED | OUTPATIENT
Start: 2023-06-22 | End: 2023-06-22 | Stop reason: HOSPADM

## 2023-06-22 RX ORDER — HYDROCODONE BITARTRATE AND ACETAMINOPHEN 5; 325 MG/1; MG/1
1-2 TABLET ORAL EVERY 4 HOURS PRN
Qty: 10 TABLET | Refills: 0 | Status: SHIPPED | OUTPATIENT
Start: 2023-06-22

## 2023-06-22 RX ORDER — FENTANYL CITRATE 50 UG/ML
25 INJECTION, SOLUTION INTRAMUSCULAR; INTRAVENOUS EVERY 5 MIN PRN
Status: DISCONTINUED | OUTPATIENT
Start: 2023-06-22 | End: 2023-06-22 | Stop reason: HOSPADM

## 2023-06-22 RX ORDER — FENTANYL CITRATE 50 UG/ML
50 INJECTION, SOLUTION INTRAMUSCULAR; INTRAVENOUS EVERY 5 MIN PRN
Status: DISCONTINUED | OUTPATIENT
Start: 2023-06-22 | End: 2023-06-22 | Stop reason: HOSPADM

## 2023-06-22 RX ORDER — HYDROMORPHONE HCL IN WATER/PF 6 MG/30 ML
0.4 PATIENT CONTROLLED ANALGESIA SYRINGE INTRAVENOUS EVERY 5 MIN PRN
Status: DISCONTINUED | OUTPATIENT
Start: 2023-06-22 | End: 2023-06-22 | Stop reason: HOSPADM

## 2023-06-22 RX ORDER — DEXAMETHASONE SODIUM PHOSPHATE 4 MG/ML
INJECTION, SOLUTION INTRA-ARTICULAR; INTRALESIONAL; INTRAMUSCULAR; INTRAVENOUS; SOFT TISSUE PRN
Status: DISCONTINUED | OUTPATIENT
Start: 2023-06-22 | End: 2023-06-22

## 2023-06-22 RX ORDER — SODIUM CHLORIDE, SODIUM LACTATE, POTASSIUM CHLORIDE, CALCIUM CHLORIDE 600; 310; 30; 20 MG/100ML; MG/100ML; MG/100ML; MG/100ML
INJECTION, SOLUTION INTRAVENOUS CONTINUOUS
Status: DISCONTINUED | OUTPATIENT
Start: 2023-06-22 | End: 2023-06-22 | Stop reason: HOSPADM

## 2023-06-22 RX ORDER — ONDANSETRON 2 MG/ML
4 INJECTION INTRAMUSCULAR; INTRAVENOUS EVERY 30 MIN PRN
Status: DISCONTINUED | OUTPATIENT
Start: 2023-06-22 | End: 2023-06-22 | Stop reason: HOSPADM

## 2023-06-22 RX ORDER — ACETAMINOPHEN 325 MG/1
975 TABLET ORAL ONCE
Status: DISCONTINUED | OUTPATIENT
Start: 2023-06-22 | End: 2023-06-22 | Stop reason: HOSPADM

## 2023-06-22 RX ORDER — SODIUM CHLORIDE, SODIUM LACTATE, POTASSIUM CHLORIDE, CALCIUM CHLORIDE 600; 310; 30; 20 MG/100ML; MG/100ML; MG/100ML; MG/100ML
INJECTION, SOLUTION INTRAVENOUS CONTINUOUS PRN
Status: DISCONTINUED | OUTPATIENT
Start: 2023-06-22 | End: 2023-06-22

## 2023-06-22 RX ORDER — ACETAMINOPHEN 325 MG/1
975 TABLET ORAL ONCE
Status: COMPLETED | OUTPATIENT
Start: 2023-06-22 | End: 2023-06-22

## 2023-06-22 RX ADMIN — SODIUM CHLORIDE, POTASSIUM CHLORIDE, SODIUM LACTATE AND CALCIUM CHLORIDE: 600; 310; 30; 20 INJECTION, SOLUTION INTRAVENOUS at 14:44

## 2023-06-22 RX ADMIN — DEXAMETHASONE SODIUM PHOSPHATE 4 MG: 4 INJECTION, SOLUTION INTRA-ARTICULAR; INTRALESIONAL; INTRAMUSCULAR; INTRAVENOUS; SOFT TISSUE at 15:05

## 2023-06-22 RX ADMIN — ONDANSETRON 4 MG: 2 INJECTION INTRAMUSCULAR; INTRAVENOUS at 15:05

## 2023-06-22 RX ADMIN — FENTANYL CITRATE 25 MCG: 50 INJECTION, SOLUTION INTRAMUSCULAR; INTRAVENOUS at 14:58

## 2023-06-22 RX ADMIN — ACETAMINOPHEN 975 MG: 325 TABLET ORAL at 11:50

## 2023-06-22 RX ADMIN — LIDOCAINE HYDROCHLORIDE 60 MG: 20 INJECTION, SOLUTION INFILTRATION; PERINEURAL at 15:00

## 2023-06-22 RX ADMIN — PROPOFOL 150 MG: 10 INJECTION, EMULSION INTRAVENOUS at 15:00

## 2023-06-22 RX ADMIN — PROPOFOL 30 MCG/KG/MIN: 10 INJECTION, EMULSION INTRAVENOUS at 15:03

## 2023-06-22 ASSESSMENT — ACTIVITIES OF DAILY LIVING (ADL)
ADLS_ACUITY_SCORE: 35
ADLS_ACUITY_SCORE: 33

## 2023-06-22 NOTE — OP NOTE
Procedure Date: 2023    REASON FOR ADMISSION:  1.  Endometrial polyp.  2.  Cervical stenosis.    OPERATIVE PROCEDURE:  Cervical dilatation, hysteroscopy, polypectomy, endometrial curettage.    OPERATIVE FINDINGS:  The patient had a very tight cervix.  Under ultrasound guidance transabdominally, we were able to slip a thin dilator through the canal to the endometrial cavity.  The cervix was dilated.  There were 2 polyps on the right side.  The remainder of the endometrial cavity was rather atrophic.  The fluid that backed up behind her cervical stenosis was a milky mucus but no sign of blood.    OPERATIVE PROCEDURE:  After general anesthesia was induced, the patient was placed in the dorsal lithotomy position and prepped and draped in the usual fashion.  A transabdominal ultrasound was used to visualize the uterus and cervix.  A speculum was placed.  A tenaculum was placed on the anterior cervical lip.  With careful guidance, the finest Swanson dilator was used to negotiate the endocervical canal.  This was clearly in the endometrial cavity, and ultrasound was discontinued.  The cervix was dilated up to place the hysteroscope.  The polyps were noted.  Both were removed with grasping forceps.  Curettage was undertaken but very minimal tissue obtained.  The cavity was irrigated and was hemostatic.  The tenaculum was removed and the speculum removed.  The patient tolerated this well and went to the recovery room.  She will be discharged to home to call if she has any problems.  We will call her next week with her pathology.    Bob Tabares Jr, MD        D: 2023   T: 2023   MT: timi    Name:     MASON MICHELLE  MRN:      [id number]        Account:        [id number]   :      1934           Procedure Date: 2023     Document: [id number]    cc:  Christiano Clark MD

## 2023-06-22 NOTE — ANESTHESIA CARE TRANSFER NOTE
Patient: Tanna Briones    Procedure: Procedure(s):  CERVICAL DILATION, HYSTEROSCOPY, POLYPECTOMY, ENDOMETRIAL CURETTAGE       Diagnosis: Postmenopausal bleeding [N95.0]  Endometrial polyp [N84.0]  Diagnosis Additional Information: No value filed.    Anesthesia Type:   General     Note:    Oropharynx: spontaneously breathing and oropharynx clear of all foreign objects  Level of Consciousness: awake  Oxygen Supplementation: face mask  Level of Supplemental Oxygen (L/min / FiO2): 6  Independent Airway: airway patency satisfactory and stable  Dentition: dentition unchanged  Vital Signs Stable: post-procedure vital signs reviewed and stable  Report to RN Given: handoff report given  Patient transferred to: PACU    Handoff Report: Identifed the Patient, Identified the Reponsible Provider, Reviewed the pertinent medical history, Discussed the surgical course, Reviewed Intra-OP anesthesia mangement and issues during anesthesia, Set expectations for post-procedure period and Allowed opportunity for questions and acknowledgement of understanding      Vitals:  Vitals Value Taken Time   /82 06/22/23 1528   Temp     Pulse 58 06/22/23 1531   Resp 9 06/22/23 1531   SpO2 100 % 06/22/23 1531   Vitals shown include unvalidated device data.    Electronically Signed By: DIANA Barker CRNA  June 22, 2023  3:31 PM

## 2023-06-22 NOTE — ANESTHESIA PROCEDURE NOTES
Airway    Staff -        Anesthesiologist:  Ivan James DO       CRNA: Kalina Belcher APRN CRNA       Performed By: CRNA  Consent for Airway        Urgency: elective  Indications and Patient Condition       Indications for airway management: gui-procedural       Induction type:intravenous       Mask difficulty assessment: 0 - not attempted    Final Airway Details       Final airway type: supraglottic airway    Supraglottic Airway Details        Type: LMA       Brand: Ambu AuraGain       LMA size: 4    Post intubation assessment        Placement verified by: capnometry, equal breath sounds and chest rise        Number of attempts at approach: 1       Number of other approaches attempted: 0       Ease of procedure: easy       Dentition: Intact and Unchanged

## 2023-06-22 NOTE — DISCHARGE INSTRUCTIONS
Same Day Surgery Discharge Instructions for  Sedation and General Anesthesia     It's not unusual to feel dizzy, light-headed or faint for up to 24 hours after surgery or while taking pain medication.  If you have these symptoms: sit for a few minutes before standing and have someone assist you when you get up to walk or use the bathroom.    You should rest and relax for the next 24 hours. We recommend you make arrangements to have an adult stay with you for at least 24 hours after your discharge.  Avoid hazardous and strenuous activity.    DO NOT DRIVE any vehicle or operate mechanical equipment for 24 hours following the end of your surgery.  Even though you may feel normal, your reactions may be affected by the medication you have received.    Do not drink alcoholic beverages for 24 hours following surgery.     Slowly progress to your regular diet as you feel able. It's not unusual to feel nauseated and/or vomit after receiving anesthesia.  If you develop these symptoms, drink clear liquids (apple juice, ginger ale, broth, 7-up, etc. ) until you feel better.  If your nausea and vomiting persists for 24 hours, please notify your surgeon.      All narcotic pain medications, along with inactivity and anesthesia, can cause constipation. Drinking plenty of liquids and increasing fiber intake will help.    For any questions of a medical nature, call your surgeon.    Do not make important decisions for 24 hours.    If you had general anesthesia, you may have a sore throat for a couple of days related to the breathing tube used during surgery.  You may use Cepacol lozenges to help with this discomfort.  If it worsens or if you develop a fever, contact your surgeon.     If you feel your pain is not well managed with the pain medications prescribed by your surgeon, please contact your surgeon's office to let them know so they can address your concerns.       **If you have questions or concerns about your procedure,  call   Dr. Tabares at **          Today you were given 975 mg of Tylenol at 11:50am. The recommended daily maximum dose is 4000 mg.

## 2023-06-22 NOTE — OP NOTE
GYN op note:  crrvical dilatation, hysteroscopy, polypectomy, curettage   ebl 2 ml fluid deficit 20 ml  No comp  Path x 2

## 2023-06-22 NOTE — ANESTHESIA POSTPROCEDURE EVALUATION
Patient: Tanna Briones    Procedure: Procedure(s):  CERVICAL DILATION, HYSTEROSCOPY, POLYPECTOMY, ENDOMETRIAL CURETTAGE       Anesthesia Type:  General    Note:  Disposition: Outpatient   Postop Pain Control: Uneventful            Sign Out: Well controlled pain   PONV: No   Neuro/Psych: Uneventful            Sign Out: Acceptable/Baseline neuro status   Airway/Respiratory: Uneventful            Sign Out: Acceptable/Baseline resp. status   CV/Hemodynamics: Uneventful            Sign Out: Acceptable CV status; No obvious hypovolemia; No obvious fluid overload   Other NRE: NONE   DID A NON-ROUTINE EVENT OCCUR?            Last vitals:  Vitals Value Taken Time   /79 06/22/23 1610   Temp 36  C (96.8  F) 06/22/23 1600   Pulse 64 06/22/23 1610   Resp 15 06/22/23 1610   SpO2 98 % 06/22/23 1610       Electronically Signed By: Ivan James DO, DO  June 22, 2023  4:24 PM

## 2023-06-23 LAB
PATH REPORT.COMMENTS IMP SPEC: NORMAL
PATH REPORT.FINAL DX SPEC: NORMAL
PATH REPORT.GROSS SPEC: NORMAL
PATH REPORT.MICROSCOPIC SPEC OTHER STN: NORMAL
PATH REPORT.RELEVANT HX SPEC: NORMAL
PHOTO IMAGE: NORMAL

## 2025-04-16 NOTE — MR AVS SNAPSHOT
After Visit Summary   8/20/2018    Tanna Briones    MRN: [id number]           Patient Information     Date Of Birth          3/8/1934        Visit Information        Provider Department      8/20/2018 10:15 AM Bob Tabares MD [facility]        Today's Diagnoses     Breast tenderness    -  1       Follow-ups after your visit        Who to contact     If you have questions or need follow up information about today's clinic visit or your schedule please contact [facility] directly at [phone].  Normal or non-critical lab and imaging results will be communicated to you by MyChart, letter or phone within 4 business days after the clinic has received the results. If you do not hear from us within 7 days, please contact the clinic through MyChart or phone. If you have a critical or abnormal lab result, we will notify you by phone as soon as possible.  Submit refill requests through [employer] or call your pharmacy and they will forward the refill request to us. Please allow 3 business days for your refill to be completed.          Additional Information About Your Visit        Care EveryWhere ID     This is your Care EveryWhere ID. This could be used by other organizations to access your [place] medical records  [id number]        Your Vitals Were     Breastfeeding? BMI (Body Mass Index)                No 21.92 kg/m2           Blood Pressure from Last 3 Encounters:   08/20/18 130/74   02/07/18 134/76   12/19/17 142/64    Weight from Last 3 Encounters:   08/20/18 120 lb 12.8 oz (54.8 kg)   02/07/18 118 lb (53.5 kg)   12/19/17 115 lb (52.2 kg)              Today, you had the following     No orders found for display       Primary Care Provider Office Phone # Fax #    Christiano Clark [phone]       [address]  [address]  [facility] [place] [address]        Equal Access to Services     KALA KRAFT: benji Francois  omerojc miguelina tapia, steve prakash. So [facility] [phone].    ATENCIÓN: Si nathen romero, tiene a maddox disposición servicios gratuitos de asistencia lingüística. Ankita al [phone].    We comply with applicable federal civil rights laws and [place] laws. We do not discriminate on the basis of race, color, national origin, age, disability, sex, sexual orientation, or gender identity.            Thank you!     Thank you for choosing [facility]  for your care. Our goal is always to provide you with excellent care. Hearing back from our patients is one way we can continue to improve our services. Please take a few minutes to complete the written survey that you may receive in the mail after your visit with us. Thank you!             Your Updated Medication List - Protect others around you: Learn how to safely use, store and throw away your medicines at www.disposemymeds.org.          This list is accurate as of 8/20/18 11:08 AM.  Always use your most recent med list.                   Brand Name Dispense Instructions for use Diagnosis    ALPRAZolam 0.25 MG tablet    XANAX     Take 0.25 mg by mouth        biotin 2.5 mg/mL Susp      Take by mouth daily        clindamycin 300 MG capsule    CLEOCIN     TK 1 C PO TID FOR 24 HOURS FOR DENTAL WORK        EPIPEN 2-SARAH 0.3 MG/0.3ML injection 2-pack   Generic drug:  EPINEPHrine      as needed        ibuprofen 200 MG tablet    ADVIL/MOTRIN     Take 400 mg by mouth        levothyroxine 25 MCG tablet    SYNTHROID/LEVOTHROID          metoprolol succinate 25 MG 24 hr tablet    TOPROL-XL     Take 75 mg by mouth        nystatin-triamcinolone cream    MYCOLOG II    15 g    Apply topically 2 times daily External use only.    Vulvar itching       PROBIOTIC ACIDOPHILUS PO           simvastatin 20 MG tablet    ZOCOR          tretinoin 0.05 % cream    RETIN-A          VITAMIN D (CHOLECALCIFEROL) PO      Take 1,000 Units by  mouth daily           Detail Level: Detailed Quality 226: Preventive Care And Screening: Tobacco Use: Screening And Cessation Intervention: Patient screened for tobacco use and is an ex/non-smoker Quality 394b: Td/Tdap Immunizations For Adolescents: Patient had one tetanus, diphtheria toxoids and acellular pertussis vaccine (Tdap) on or between the patient's 10th and 13th birthdays.

## (undated) DEVICE — DECANTER BAG 2002S

## (undated) DEVICE — ESU ABLATION NOVASURE ADVANCED NS2013KITUS

## (undated) DEVICE — TUBING SUCTION 12"X1/4" N612

## (undated) DEVICE — SOL WATER IRRIG 1000ML BOTTLE 2F7114

## (undated) DEVICE — PACK TVT HYSTEROSCOPY SMA15HYFSE

## (undated) DEVICE — SOL NACL 0.9% IRRIG 1000ML BOTTLE 2F7124

## (undated) DEVICE — TUBING IRRIG TUR Y TYPE 96" LF 6543-01

## (undated) DEVICE — LINEN TOWEL PACK X5 5464

## (undated) DEVICE — SOL NACL 0.9% INJ 1000ML BAG 2B1324X

## (undated) RX ORDER — ACETAMINOPHEN 325 MG/1
TABLET ORAL
Status: DISPENSED
Start: 2023-06-22

## (undated) RX ORDER — PROPOFOL 10 MG/ML
INJECTION, EMULSION INTRAVENOUS
Status: DISPENSED
Start: 2023-06-22

## (undated) RX ORDER — FENTANYL CITRATE 50 UG/ML
INJECTION, SOLUTION INTRAMUSCULAR; INTRAVENOUS
Status: DISPENSED
Start: 2023-06-22